# Patient Record
Sex: FEMALE | Race: WHITE | NOT HISPANIC OR LATINO | Employment: FULL TIME | URBAN - METROPOLITAN AREA
[De-identification: names, ages, dates, MRNs, and addresses within clinical notes are randomized per-mention and may not be internally consistent; named-entity substitution may affect disease eponyms.]

---

## 2017-07-18 RX ORDER — NORETHINDRONE ACETATE AND ETHINYL ESTRADIOL 1.5-30(21)
1 KIT ORAL DAILY
Qty: 84 TABLET | Refills: 0 | Status: SHIPPED | OUTPATIENT
Start: 2017-07-18 | End: 2017-10-12 | Stop reason: SDUPTHER

## 2017-10-12 RX ORDER — NORETHINDRONE ACETATE AND ETHINYL ESTRADIOL 1.5-30(21)
1 KIT ORAL DAILY
Qty: 84 TABLET | Refills: 0 | Status: SHIPPED | OUTPATIENT
Start: 2017-10-12 | End: 2017-12-05 | Stop reason: SDUPTHER

## 2017-12-05 ENCOUNTER — OFFICE VISIT (OUTPATIENT)
Dept: OBSTETRICS AND GYNECOLOGY | Facility: CLINIC | Age: 27
End: 2017-12-05
Payer: COMMERCIAL

## 2017-12-05 VITALS
DIASTOLIC BLOOD PRESSURE: 60 MMHG | BODY MASS INDEX: 19.71 KG/M2 | WEIGHT: 130.06 LBS | SYSTOLIC BLOOD PRESSURE: 110 MMHG | HEIGHT: 68 IN

## 2017-12-05 DIAGNOSIS — Z30.9 ENCOUNTER FOR CONTRACEPTIVE MANAGEMENT, UNSPECIFIED TYPE: ICD-10-CM

## 2017-12-05 DIAGNOSIS — Z11.3 SCREENING EXAMINATION FOR STD (SEXUALLY TRANSMITTED DISEASE): ICD-10-CM

## 2017-12-05 DIAGNOSIS — Z01.419 ENCOUNTER FOR GYNECOLOGICAL EXAMINATION: Primary | ICD-10-CM

## 2017-12-05 PROCEDURE — 99395 PREV VISIT EST AGE 18-39: CPT | Mod: S$GLB,,, | Performed by: OBSTETRICS & GYNECOLOGY

## 2017-12-05 PROCEDURE — 87591 N.GONORRHOEAE DNA AMP PROB: CPT

## 2017-12-05 PROCEDURE — 99999 PR PBB SHADOW E&M-EST. PATIENT-LVL II: CPT | Mod: PBBFAC,,, | Performed by: OBSTETRICS & GYNECOLOGY

## 2017-12-05 RX ORDER — ACYCLOVIR 400 MG/1
400 TABLET ORAL 2 TIMES DAILY
Refills: 11 | COMMUNITY
Start: 2017-10-31 | End: 2018-04-20 | Stop reason: SDUPTHER

## 2017-12-05 RX ORDER — LEVALBUTEROL TARTRATE 45 UG/1
AEROSOL, METERED ORAL
Refills: 0 | COMMUNITY
Start: 2017-10-10 | End: 2018-04-20 | Stop reason: SDUPTHER

## 2017-12-05 RX ORDER — NORETHINDRONE ACETATE AND ETHINYL ESTRADIOL 1.5-30(21)
1 KIT ORAL DAILY
Qty: 84 TABLET | Refills: 3 | Status: SHIPPED | OUTPATIENT
Start: 2017-12-05 | End: 2018-11-26 | Stop reason: SDUPTHER

## 2017-12-05 NOTE — PROGRESS NOTES
"CC: Well woman exam    No Harris is a 27 y.o. female  presents for a well woman exam.  She is established.  No gyn issues.  This past summer had angio at [facility] that shows her pig valve will last her another 3 years which is very promising.      Past Medical History:   Diagnosis Date    Tetralogy of Fallot s/p repair     w/ valve replacement       Past Surgical History:   Procedure Laterality Date    BREAST SURGERY Right 2016    Removal of previous implant and liposuction; fat graft to breast. Done in [place]    Open heart surgery      x4 to correct septal defects and 2 vavle replacement; right breast implant       OB History    Para Term  AB Living   0 0 0 0 0 0   SAB TAB Ectopic Multiple Live Births   0 0 0 0               Family History   Problem Relation Age of Onset    No Known Problems Father     No Known Problems Mother     Breast cancer Neg Hx     Diabetes Neg Hx        Social History   Substance Use Topics    Smoking status: Never Smoker    Smokeless tobacco: Never Used    Alcohol use Yes       /60   Ht 5' 8" (1.727 m)   Wt 59 kg (130 lb 1.1 oz)   LMP 11/15/2017   BMI 19.78 kg/m²     ROS:  GENERAL: Denies weight gain or weight loss. Feeling well overall.   SKIN: Denies rash or lesions.   HEAD: Denies head injury or headache.   NODES: Denies enlarged lymph nodes.   CHEST: Denies chest pain or shortness of breath.   CARDIOVASCULAR: Denies palpitations or left sided chest pain.   ABDOMEN: No abdominal pain, constipation, diarrhea, nausea, vomiting or rectal bleeding.   URINARY: No frequency, dysuria, hematuria, or burning on urination.  REPRODUCTIVE: See HPI.   BREASTS: The patient performs breast self-examination and denies pain, lumps, or nipple discharge.   HEMATOLOGIC: No easy bruisability or excessive bleeding.  MUSCULOSKELETAL: Denies joint pain or swelling.   NEUROLOGIC: Denies syncope or weakness.   PSYCHIATRIC: Denies depression, anxiety or mood " swings.    Physical Exam:    APPEARANCE: Well nourished, well developed, in no acute distress.  AFFECT: WNL, alert and oriented x 3  SKIN: No acne or hirsutism  NECK: Neck symmetric without masses or thyromegaly  NODES: No inguinal, cervical, axillary, or femoral lymph node enlargement  CHEST: Good respiratory effect  ABDOMEN: Soft.  No tenderness or masses.  No hepatosplenomegaly.  No hernias.  BREASTS: Symmetrical, no skin changes or visible lesions.  No palpable masses, nipple discharge bilaterally.  PELVIC: Normal external genitalia without lesions.  Normal hair distribution.  Adequate perineal body, normal urethral meatus.  Vagina moist and well rugated without lesions or discharge.  Cervix pink, without lesions, discharge or tenderness.  No significant cystocele or rectocele.  Bimanual exam shows uterus to be normal size, regular, mobile and nontender.  Adnexa without masses or tenderness.    EXTREMITIES: No edema.    ASSESSMENT AND PLAN  1. Encounter for gynecological examination     2. Screening examination for STD (sexually transmitted disease)  C. trachomatis/N. gonorrhoeae by AMP DNA Cervix   3. Encounter for contraceptive management, unspecified type  norethindrone-ethinyl estradiol-iron (GILDESS FE 1.5/30, 28,) 1.5 mg-30 mcg (21)/75 mg (7) tablet       Patient was counseled today on A.C.S. Pap guidelines and recommendations for yearly pelvic exams, mammograms and monthly self breast exams; to see her PCP for other health maintenance.     Return in about 1 year (around 12/5/2018).

## 2017-12-06 LAB
C TRACH DNA SPEC QL NAA+PROBE: NOT DETECTED
N GONORRHOEA DNA SPEC QL NAA+PROBE: NOT DETECTED

## 2018-04-20 ENCOUNTER — OFFICE VISIT (OUTPATIENT)
Dept: INTERNAL MEDICINE | Facility: CLINIC | Age: 28
End: 2018-04-20
Payer: COMMERCIAL

## 2018-04-20 VITALS
HEIGHT: 68 IN | RESPIRATION RATE: 16 BRPM | WEIGHT: 138.88 LBS | HEART RATE: 61 BPM | TEMPERATURE: 98 F | BODY MASS INDEX: 21.05 KG/M2 | SYSTOLIC BLOOD PRESSURE: 93 MMHG | DIASTOLIC BLOOD PRESSURE: 60 MMHG

## 2018-04-20 DIAGNOSIS — Z00.00 ANNUAL PHYSICAL EXAM: Primary | ICD-10-CM

## 2018-04-20 DIAGNOSIS — Z91.09 ENVIRONMENTAL ALLERGIES: ICD-10-CM

## 2018-04-20 DIAGNOSIS — R21 RASH: ICD-10-CM

## 2018-04-20 DIAGNOSIS — B00.9 RECURRENT HERPES SIMPLEX: ICD-10-CM

## 2018-04-20 DIAGNOSIS — J45.909 CHILDHOOD ASTHMA, UNSPECIFIED ASTHMA SEVERITY, UNSPECIFIED WHETHER COMPLICATED, UNSPECIFIED WHETHER PERSISTENT: ICD-10-CM

## 2018-04-20 PROCEDURE — 99999 PR PBB SHADOW E&M-EST. PATIENT-LVL IV: CPT | Mod: PBBFAC,,, | Performed by: INTERNAL MEDICINE

## 2018-04-20 PROCEDURE — 99385 PREV VISIT NEW AGE 18-39: CPT | Mod: S$GLB,,, | Performed by: INTERNAL MEDICINE

## 2018-04-20 RX ORDER — ACYCLOVIR 400 MG/1
400 TABLET ORAL 2 TIMES DAILY
Qty: 60 TABLET | Refills: 11 | Status: SHIPPED | OUTPATIENT
Start: 2018-04-20

## 2018-04-20 RX ORDER — LEVALBUTEROL TARTRATE 45 UG/1
AEROSOL, METERED ORAL
Qty: 15 G | Refills: 1 | Status: SHIPPED | OUTPATIENT
Start: 2018-04-20

## 2018-04-20 NOTE — PROGRESS NOTES
Subjective:       Patient ID: No Harris is a 27 y.o. female.    Chief Complaint: Annual Exam    HPI     27 y.o. female here for annual exam.     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  [place] once a week  Diet:  Learning how to cook more.  Does not eat out all that often. Aware of what she eats and tries to eat better.  Drinks mostly water, some tea.    She is on acyclovir twice daily as a preventive measure.  She was told to do this for a year.  It worked for 11 months.  And went back.  Did not get further advice.  She has an appointment with ID - Dr. Lo.  She was told to see rheumatology as well.  She has had pealing of her lips as well.  She gets bumps on her lips that last for 1-3 days and recur.    Past Medical History:   Diagnosis Date    Childhood asthma     Environmental allergies     Fever blister     recurrent    Tetralogy of Fallot s/p repair     w/ valve replacement     Past Surgical History:   Procedure Laterality Date    BREAST SURGERY Right 2016    Removal of previous implant and liposuction; fat graft to breast. Done in [place].  3 augmentations on right side    Open heart surgery      x4 to correct septal defects and 2 vavle replacement; right breast implant    RETINAL LASER PROCEDURE       Social History     Social History    Marital status: Single     Spouse name: N/A    Number of children: N/A    Years of education: N/A     Occupational History    Not on file.     Social History Main Topics    Smoking status: Never Smoker    Smokeless tobacco: Never Used    Alcohol use Yes      Comment: once every other week    Drug use: No    Sexual activity: Yes     Partners: Male     Birth  control/ protection: OCP     Other Topics Concern    Not on file     Social History Narrative    No narrative on file     Review of patient's allergies indicates:   Allergen Reactions    Morphine     Tramadol     Vicodin [hydrocodone-acetaminophen]      Ms. Harris had no medications administered during this visit.    Review of Systems   Constitutional: Negative for chills, fever and unexpected weight change.   HENT: Negative for congestion, postnasal drip and sore throat.    Eyes: Negative for redness and visual disturbance.   Respiratory: Negative for cough and shortness of breath.    Cardiovascular: Negative for chest pain and palpitations.   Gastrointestinal: Negative for abdominal pain, constipation, diarrhea, nausea and vomiting.   Genitourinary: Negative for dysuria, frequency and hematuria.   Musculoskeletal: Negative for arthralgias and myalgias.   Skin: Negative for color change and rash.   Neurological: Negative for dizziness and headaches.       Objective:      Physical Exam   Constitutional: She is oriented to person, place, and time. She appears well-developed and well-nourished.   HENT:   Head: Normocephalic and atraumatic.   Mouth/Throat: No oropharyngeal exudate.   Eyes: EOM are normal. Pupils are equal, round, and reactive to light. Right eye exhibits no discharge. Left eye exhibits no discharge. No scleral icterus.   Neck: Normal range of motion. Neck supple. No tracheal deviation present. No thyromegaly present.   Cardiovascular: Normal rate, regular rhythm and normal heart sounds.  Exam reveals no gallop and no friction rub.    No murmur heard.  Pulmonary/Chest: Effort normal and breath sounds normal. No respiratory distress. She has no wheezes. She has no rales. She exhibits no tenderness.   Abdominal: Soft. Bowel sounds are normal. She exhibits no distension and no mass. There is no tenderness. There is no rebound and no guarding.   Musculoskeletal: Normal range of motion. She exhibits no  edema or tenderness.   Neurological: She is alert and oriented to person, place, and time.   Skin: Skin is warm and dry. No rash noted. No erythema. No pallor.   Psychiatric: She has a normal mood and affect. Her behavior is normal.   Vitals reviewed.      Assessment:       1. Annual physical exam    2. Recurrent herpes simplex    3. Rash    4. Childhood asthma, unspecified asthma severity, unspecified whether complicated, unspecified whether persistent    5. Environmental allergies        Plan:       1.  Check CBC, CMP, TSH, lipids.  Discussed exercise with patient.  Refuses flu vaccine.  Up-to-date on tetanus vaccine.  Up-to-date on GYN screening.  No need for mammogram or colonoscopy yet.  2.  Refer to infectious disease.  Refill of acyclovir 400 mg twice a day given.  Check HSV titers.  3.  Refer to rheumatology.  Check DEMETRIO.  4.  Refill of Xopenex given as needed.  5.  Benadryl as needed.    Return to clinic in 1 year or sooner if needed.  Advised patient that she sees rheumatology and infectious disease, because of the nonspecific nature of her symptoms, to return so we can discuss further.

## 2018-04-24 ENCOUNTER — LAB VISIT (OUTPATIENT)
Dept: LAB | Facility: HOSPITAL | Age: 28
End: 2018-04-24
Attending: INTERNAL MEDICINE
Payer: COMMERCIAL

## 2018-04-24 DIAGNOSIS — B00.9 RECURRENT HERPES SIMPLEX: ICD-10-CM

## 2018-04-24 DIAGNOSIS — Z00.00 ANNUAL PHYSICAL EXAM: ICD-10-CM

## 2018-04-24 DIAGNOSIS — R21 RASH: ICD-10-CM

## 2018-04-24 LAB
ALBUMIN SERPL BCP-MCNC: 3.9 G/DL
ALP SERPL-CCNC: 34 U/L
ALT SERPL W/O P-5'-P-CCNC: 9 U/L
ANION GAP SERPL CALC-SCNC: 8 MMOL/L
AST SERPL-CCNC: 14 U/L
BASOPHILS # BLD AUTO: 0.04 K/UL
BASOPHILS NFR BLD: 0.7 %
BILIRUB SERPL-MCNC: 0.8 MG/DL
BUN SERPL-MCNC: 11 MG/DL
CALCIUM SERPL-MCNC: 9.4 MG/DL
CHLORIDE SERPL-SCNC: 108 MMOL/L
CHOLEST SERPL-MCNC: 138 MG/DL
CHOLEST/HDLC SERPL: 3.7 {RATIO}
CO2 SERPL-SCNC: 22 MMOL/L
CREAT SERPL-MCNC: 0.8 MG/DL
CRP SERPL-MCNC: 4 MG/L
DIFFERENTIAL METHOD: ABNORMAL
EOSINOPHIL # BLD AUTO: 0.3 K/UL
EOSINOPHIL NFR BLD: 5.2 %
ERYTHROCYTE [DISTWIDTH] IN BLOOD BY AUTOMATED COUNT: 13 %
ERYTHROCYTE [SEDIMENTATION RATE] IN BLOOD BY WESTERGREN METHOD: 9 MM/HR
EST. GFR  (AFRICAN AMERICAN): >60 ML/MIN/1.73 M^2
EST. GFR  (NON AFRICAN AMERICAN): >60 ML/MIN/1.73 M^2
GLUCOSE SERPL-MCNC: 80 MG/DL
HCT VFR BLD AUTO: 36 %
HDLC SERPL-MCNC: 37 MG/DL
HDLC SERPL: 26.8 %
HGB BLD-MCNC: 12.3 G/DL
IMM GRANULOCYTES # BLD AUTO: 0.02 K/UL
IMM GRANULOCYTES NFR BLD AUTO: 0.4 %
LDLC SERPL CALC-MCNC: 82 MG/DL
LYMPHOCYTES # BLD AUTO: 1.8 K/UL
LYMPHOCYTES NFR BLD: 32.6 %
MCH RBC QN AUTO: 32.2 PG
MCHC RBC AUTO-ENTMCNC: 34.2 G/DL
MCV RBC AUTO: 94 FL
MONOCYTES # BLD AUTO: 0.4 K/UL
MONOCYTES NFR BLD: 6.6 %
NEUTROPHILS # BLD AUTO: 3.1 K/UL
NEUTROPHILS NFR BLD: 54.5 %
NONHDLC SERPL-MCNC: 101 MG/DL
NRBC BLD-RTO: 0 /100 WBC
PLATELET # BLD AUTO: 184 K/UL
PMV BLD AUTO: 10.6 FL
POTASSIUM SERPL-SCNC: 3.9 MMOL/L
PROT SERPL-MCNC: 7.4 G/DL
RBC # BLD AUTO: 3.82 M/UL
SODIUM SERPL-SCNC: 138 MMOL/L
TRIGL SERPL-MCNC: 95 MG/DL
TSH SERPL DL<=0.005 MIU/L-ACNC: 2.68 UIU/ML
WBC # BLD AUTO: 5.61 K/UL

## 2018-04-24 PROCEDURE — 36415 COLL VENOUS BLD VENIPUNCTURE: CPT | Mod: PO

## 2018-04-24 PROCEDURE — 84443 ASSAY THYROID STIM HORMONE: CPT

## 2018-04-24 PROCEDURE — 86696 HERPES SIMPLEX TYPE 2 TEST: CPT

## 2018-04-24 PROCEDURE — 80061 LIPID PANEL: CPT

## 2018-04-24 PROCEDURE — 86140 C-REACTIVE PROTEIN: CPT

## 2018-04-24 PROCEDURE — 80053 COMPREHEN METABOLIC PANEL: CPT

## 2018-04-24 PROCEDURE — 85025 COMPLETE CBC W/AUTO DIFF WBC: CPT

## 2018-04-24 PROCEDURE — 86038 ANTINUCLEAR ANTIBODIES: CPT

## 2018-04-24 PROCEDURE — 85651 RBC SED RATE NONAUTOMATED: CPT

## 2018-04-25 LAB — ANA SER QL IF: NORMAL

## 2018-04-27 LAB
HSV1 IGG SERPL QL IA: POSITIVE
HSV2 IGG SERPL QL IA: NEGATIVE

## 2018-05-01 ENCOUNTER — OFFICE VISIT (OUTPATIENT)
Dept: INFECTIOUS DISEASES | Facility: CLINIC | Age: 28
End: 2018-05-01
Payer: COMMERCIAL

## 2018-05-01 ENCOUNTER — PATIENT MESSAGE (OUTPATIENT)
Dept: INFECTIOUS DISEASES | Facility: CLINIC | Age: 28
End: 2018-05-01

## 2018-05-01 VITALS — BODY MASS INDEX: 20.58 KG/M2 | HEIGHT: 68 IN | WEIGHT: 135.81 LBS | TEMPERATURE: 99 F

## 2018-05-01 DIAGNOSIS — Z87.74 TETRALOGY OF FALLOT S/P REPAIR: ICD-10-CM

## 2018-05-01 DIAGNOSIS — K13.0 LIP LESION: Primary | ICD-10-CM

## 2018-05-01 DIAGNOSIS — J45.909 CHILDHOOD ASTHMA, UNSPECIFIED ASTHMA SEVERITY, UNSPECIFIED WHETHER COMPLICATED, UNSPECIFIED WHETHER PERSISTENT: ICD-10-CM

## 2018-05-01 PROCEDURE — 99999 PR PBB SHADOW E&M-EST. PATIENT-LVL III: CPT | Mod: PBBFAC,,, | Performed by: INTERNAL MEDICINE

## 2018-05-01 PROCEDURE — 99205 OFFICE O/P NEW HI 60 MIN: CPT | Mod: S$GLB,,, | Performed by: INTERNAL MEDICINE

## 2018-05-01 NOTE — PROGRESS NOTES
Subjective:      Patient ID: No Harris is a 27 y.o. female.    Chief Complaint:   Consult re lip lesions from Dr. Josh Morales      History of Present Illness    Over 1 year ago having recurrent herpes labialis lesions. These generally occurred on the external lip but not at the vermilion border. She has had two episodes of severe inflammation of the external lip surface with sloughing of the skin of the external lip which was extremely painful and took weeks to heal up. Her treating physician thought it mighe be related to HSV infection and gave her prophylactic acyclovir 400 mg bid which prevented problems for 11 months, but in the 11th  Month, she developed the second episode of lip inflammation with sloughing of the skin. She did not see any bullous lesions or vesicular lesions which preceded the skin sloughing. Someone suggested to her that it was caused by strep infection, but she says that no cultures were done at any point. She has had asthma and multiple surgeries for Tetralogy of Fallot (has prosthetic AV) but has otherwise been in good health.    Today she has no oral lesions. Had a comprehensive lab profile on 4/24 which was completely normal including ESR and CRP.    Review of Systems   Constitution: Negative for chills, decreased appetite, fever, weakness, malaise/fatigue, night sweats, weight gain and weight loss.   HENT: Positive for congestion. Negative for ear pain, hearing loss, hoarse voice, sore throat and tinnitus.    Eyes: Negative for blurred vision, redness and visual disturbance.   Cardiovascular: Negative for chest pain, leg swelling and palpitations.   Respiratory: Negative for cough, hemoptysis, shortness of breath and sputum production.    Hematologic/Lymphatic: Negative for adenopathy. Does not bruise/bleed easily.   Skin: Positive for dry skin, itching and rash. Negative for suspicious lesions.   Musculoskeletal: Positive for back pain. Negative for joint pain, myalgias and neck pain.    Gastrointestinal: Negative for abdominal pain, constipation, diarrhea, heartburn, nausea and vomiting.   Genitourinary: Negative for dysuria, flank pain, frequency, hematuria, hesitancy and urgency.   Neurological: Positive for headaches. Negative for dizziness, numbness and paresthesias.   Psychiatric/Behavioral: Negative for depression and memory loss. The patient does not have insomnia and is not nervous/anxious.      Objective:   Physical Exam   Constitutional: She is oriented to person, place, and time. She appears well-developed and well-nourished. No distress.   HENT:   Mouth/Throat: Oropharynx is clear and moist.   No oral or lip lesions present   Eyes: EOM are normal. Pupils are equal, round, and reactive to light.   Neck: Normal range of motion. No JVD present. No tracheal deviation present. No thyromegaly present.   Lymphadenopathy:     She has no cervical adenopathy.   Neurological: She is alert and oriented to person, place, and time.   Skin: Skin is warm and dry. No rash noted. No erythema.   Psychiatric: She has a normal mood and affect. Her behavior is normal. Thought content normal.   Vitals reviewed.    Assessment:       1. Lip lesions; could be HSV but could also be some type of bullous disease like Álvarez Fadi syndrome, pemphigus, etc.   2. Tetralogy of Fallot s/p repair    3. Childhood asthma, unspecified asthma severity, unspecified whether complicated, unspecified whether persistent          Plan:         1. She has appt to see rheumatology    2. Asked her to notify me if lesions recur so she could be worked up with HSV NAAT and derm appraisal( biopsy) at that time

## 2018-05-01 NOTE — LETTER
May 1, 2018      Josh Morales MD  [address] [facility]  [place] [address]           [facility]  [address]  [place] [address]  Phone: [phone]  Fax: [phone]          Patient: No Harris   MR Number: [id number]   YOB: 1990   Date of Visit: 5/1/2018       Dear Dr. Josh Morales:    Thank you for referring No Harris to me for evaluation. Attached you will find relevant portions of my assessment and plan of care.    If you have questions, please do not hesitate to call me. I look forward to following No Harris along with you.    Sincerely,    Calvin Phipps MD    Enclosure  CC:  No Recipients    If you would like to receive this communication electronically, please contact [email or web address] or [phone] to request more information on [facility] access.    For providers and/or their staff who would like to refer a patient to [facility], please contact us through our one-stop-shop provider referral line, [facility] , at [phone].    If you feel you have received this communication in error or would no longer like to receive these types of communications, please e-mail [email or web address]

## 2018-05-10 ENCOUNTER — INITIAL CONSULT (OUTPATIENT)
Dept: RHEUMATOLOGY | Facility: CLINIC | Age: 28
End: 2018-05-10
Payer: COMMERCIAL

## 2018-05-10 VITALS
DIASTOLIC BLOOD PRESSURE: 64 MMHG | WEIGHT: 134.63 LBS | HEART RATE: 69 BPM | BODY MASS INDEX: 20.4 KG/M2 | SYSTOLIC BLOOD PRESSURE: 99 MMHG | HEIGHT: 68 IN

## 2018-05-10 DIAGNOSIS — K13.0 LIP ULCER: Primary | ICD-10-CM

## 2018-05-10 PROCEDURE — 3008F BODY MASS INDEX DOCD: CPT | Mod: CPTII,S$GLB,, | Performed by: INTERNAL MEDICINE

## 2018-05-10 PROCEDURE — 99204 OFFICE O/P NEW MOD 45 MIN: CPT | Mod: S$GLB,,, | Performed by: INTERNAL MEDICINE

## 2018-05-10 PROCEDURE — 99999 PR PBB SHADOW E&M-EST. PATIENT-LVL III: CPT | Mod: PBBFAC,,, | Performed by: INTERNAL MEDICINE

## 2018-05-10 RX ORDER — CLINDAMYCIN HYDROCHLORIDE 300 MG/1
300 CAPSULE ORAL EVERY 6 HOURS
Qty: 40 CAPSULE | Refills: 0 | Status: SHIPPED | OUTPATIENT
Start: 2018-05-10 | End: 2018-05-20

## 2018-05-10 RX ORDER — CETIRIZINE HYDROCHLORIDE 5 MG/1
5 TABLET ORAL DAILY
COMMUNITY
End: 2019-02-12

## 2018-05-10 RX ORDER — CLINDAMYCIN HYDROCHLORIDE 300 MG/1
300 CAPSULE ORAL EVERY 6 HOURS
Qty: 40 CAPSULE | Refills: 0 | Status: SHIPPED | OUTPATIENT
Start: 2018-05-10 | End: 2018-05-10 | Stop reason: SDUPTHER

## 2018-05-10 NOTE — PROGRESS NOTES
Chief Complaint   Patient presents with    Disease Management       Patient was referred by     History of presenting illness    27 year old female has recurrent    4/2018 she had labs   Normal CBC,CMP  Normal TSH  HSV 1 IgG  ESR,CRP normal  DEMETRIO negative      A year ago :her symptoms started  She got many fever blisters  Very painful  All on the lip line  One comes,heals and the other comes within days  None inside the mouth  When one blister comes up,the skin around it on the lips becomes dry dry and then it cracks and she has to constantly use chapstick  Then finally the skin sloughs off    When this happened the first time she couldn't eat,smile or talk    Went to urgent care  Secondary strep skin infection diagnosed from fever blisters  She got antibiotics and liquid lidocaine  She felt better    Then     She got acyclovir bid,she did well for 11 months    Now 12th month after the first episode  She had only one fever blister,but the skin around the blister peeled off this time    She saw PCP,she suggested  See ID,rheumatology :     Now all the blisters are gone  She doesn't feel normal yet  The lower lip feels sore and has few red bumps    She admits to biting her cheeks     She is allergic to many things  She gets allergy shots once a week    No malar rash,photosensitivity  Eczema +  No telangiectasias  No calcinosis  No patchy alopecia  No nasal ulcers  No sicca symptoms   No pleurisy or any cardiopulmonary complaints  No dysphagia,diplopia and dysphonia and muscle weakness  No n/v/d/c  No acid reflux+  No raynaud's+  No digital ulcers     No cytopenias  No renal issues    No blood clots     No fever,chills,night sweats,weight loss and loss of appetite     No pregnancy losses    No neurologic issues    No arthralgias      Past history : surgery for TOF    Family history : none    Social history : not a smoker,alcholic    Review of Systems   Constitutional: Negative for activity change, appetite change,  chills, diaphoresis, fatigue, fever and unexpected weight change.   HENT: Negative for congestion, dental problem, drooling, ear discharge, ear pain, facial swelling, hearing loss, mouth sores, nosebleeds, postnasal drip, rhinorrhea, sinus pain, sinus pressure, sneezing, sore throat, tinnitus, trouble swallowing and voice change.    Eyes: Negative for photophobia, pain, discharge, redness, itching and visual disturbance.   Respiratory: Negative for apnea, cough, choking, chest tightness, shortness of breath, wheezing and stridor.    Cardiovascular: Negative for chest pain, palpitations and leg swelling.   Gastrointestinal: Negative for abdominal distention, abdominal pain, anal bleeding, blood in stool, constipation, diarrhea, nausea, rectal pain and vomiting.   Endocrine: Negative for cold intolerance, heat intolerance, polydipsia, polyphagia and polyuria.   Genitourinary: Negative for decreased urine volume, difficulty urinating, dysuria, enuresis, flank pain, frequency, genital sores, hematuria and urgency.   Musculoskeletal: Negative for arthralgias, back pain, gait problem, joint swelling, myalgias, neck pain and neck stiffness.   Skin: Negative for color change, pallor, rash and wound.   Allergic/Immunologic: Negative for environmental allergies, food allergies and immunocompromised state.   Neurological: Negative for dizziness, tremors, seizures, syncope, facial asymmetry, speech difficulty, weakness, light-headedness, numbness and headaches.   Hematological: Negative for adenopathy. Does not bruise/bleed easily.   Psychiatric/Behavioral: Negative for agitation, behavioral problems, confusion, decreased concentration, dysphoric mood, hallucinations, self-injury, sleep disturbance and suicidal ideas. The patient is not nervous/anxious and is not hyperactive.      Physical Exam     LOWERY-28 tender joint count: 0  LOWERY-28 swollen joint count: 0    Physical Exam   Constitutional: She is oriented to person, place,  and time and well-developed, well-nourished, and in no distress. No distress.   HENT:   Head: Normocephalic.   Mouth/Throat: Oropharynx is clear and moist.   Eyes: Conjunctivae are normal. Pupils are equal, round, and reactive to light. Right eye exhibits no discharge. Left eye exhibits no discharge. No scleral icterus.   Neck: Normal range of motion. No thyromegaly present.   Cardiovascular: Normal rate, regular rhythm, normal heart sounds and intact distal pulses.    Pulmonary/Chest: Effort normal and breath sounds normal. No stridor.   Abdominal: Soft. Bowel sounds are normal.   Lymphadenopathy:     She has no cervical adenopathy.   Neurological: She is alert and oriented to person, place, and time.   Skin: Skin is warm. No rash noted. She is not diaphoretic.     Acne on the chin  Angular cheilitis  Small red lesions on the inner lip   Psychiatric: Affect and judgment normal.   Musculoskeletal: Normal range of motion.         Assessment     27 year old female with tetralogy of fallot s/p repair comes with two episodes of lip lesions in the past year  First episode she had recurrent lesions on the lip,which caused sloughing of the lip  This year she had one lesion which again led to sloughing of the lip  She has no other symptoms  She has no oral and nasal ulcers    Exam today : angular cheilitis and lip erythematous lesions    This doesn't look like an autoimmune illness like lupus or vasculitis or behcet's  DEMETRIO negative in addition    1. Lip ulcer        Reviewed labs    New problem     Plan    Offered a course of antibiotics    Suggested vit b1,b6 and c    See dermatology,may be a biopsy of the ulcer when it recurs again    ID has asked her to call them when she flares so that they can take cultures     Suggested to talk to allergy and see if they have suggestions,since she says her lips get dry and crack     No was seen today for disease management.    Diagnoses and all orders for this visit:    Lip  ulcer  -     Sjogrens syndrome-A extractable nuclear antibody; Future  -     Sjogrens syndrome-B extractable nuclear antibody; Future  -     Anti-neutrophilic cytoplasmic antibody; Future  -     Myeloperoxidase Antibody (MPO); Future  -     Proteinase 3 Autoantibodies; Future  -     Rheumatoid factor; Future  -     Cyclic citrul peptide antibody, IgG; Future  -     C3 complement; Standing  -     C4 complement; Standing  -     Complement, total; Future  -     Cryoglobulin; Future  -     RPR; Future  -     HIV-1 and HIV-2 antibodies; Future  -     Hepatitis B surface antigen; Future  -     Hepatitis C antibody; Future  -     HEPATITIS B SURFACE ANTIBODY; Future  -     Hepatitis B core antibody, total; Future  -     HLA B27 Antigen; Future    Other orders  -     Discontinue: clindamycin (CLEOCIN) 300 MG capsule; Take 1 capsule (300 mg total) by mouth every 6 (six) hours.  -     clindamycin (CLEOCIN) 300 MG capsule; Take 1 capsule (300 mg total) by mouth every 6 (six) hours.        Labs to complete the w/u  rtc prn

## 2018-05-10 NOTE — LETTER
May 10, 2018      Josh Morales MD  [address] [facility]  [place] [address]           [facility] - Rheumatology  [address]  [place] [address]  Phone: [phone]  Fax: [phone]          Patient: No Harris   MR Number: [id number]   YOB: 1990   Date of Visit: 5/10/2018       Dear Dr. Josh Morales:    Thank you for referring No Harris to me for evaluation. Attached you will find relevant portions of my assessment and plan of care.    If you have questions, please do not hesitate to call me. I look forward to following No Harris along with you.    Sincerely,    Mohan Crenshaw MD    Enclosure  CC:  No Recipients    If you would like to receive this communication electronically, please contact [email or web address] or [phone] to request more information on [facility] access.    For providers and/or their staff who would like to refer a patient to [facility], please contact us through our one-stop-shop provider referral line, [facility] , at [phone].    If you feel you have received this communication in error or would no longer like to receive these types of communications, please e-mail [email or web address]          Subjective:       Patient ID: Ifeoma Bernal 

## 2018-05-15 ENCOUNTER — PATIENT MESSAGE (OUTPATIENT)
Dept: INFECTIOUS DISEASES | Facility: CLINIC | Age: 28
End: 2018-05-15

## 2018-05-16 DIAGNOSIS — K13.0 LIP LESION: Primary | ICD-10-CM

## 2018-05-18 ENCOUNTER — LAB VISIT (OUTPATIENT)
Dept: LAB | Facility: HOSPITAL | Age: 28
End: 2018-05-18
Attending: INTERNAL MEDICINE
Payer: COMMERCIAL

## 2018-05-18 DIAGNOSIS — K13.0 LIP LESION: ICD-10-CM

## 2018-05-18 LAB
C3 SERPL-MCNC: 127 MG/DL
C4 SERPL-MCNC: 32 MG/DL
CCP AB SER IA-ACNC: 0.5 U/ML
HBV CORE AB SERPL QL IA: NEGATIVE
HBV SURFACE AB SER-ACNC: NEGATIVE M[IU]/ML
HBV SURFACE AG SERPL QL IA: NEGATIVE
HCV AB SERPL QL IA: NEGATIVE
HIV 1+2 AB+HIV1 P24 AG SERPL QL IA: NEGATIVE
RHEUMATOID FACT SERPL-ACNC: <10 IU/ML

## 2018-05-18 PROCEDURE — 86803 HEPATITIS C AB TEST: CPT

## 2018-05-18 PROCEDURE — 86255 FLUORESCENT ANTIBODY SCREEN: CPT

## 2018-05-18 PROCEDURE — 83516 IMMUNOASSAY NONANTIBODY: CPT

## 2018-05-18 PROCEDURE — 86592 SYPHILIS TEST NON-TREP QUAL: CPT

## 2018-05-18 PROCEDURE — 87340 HEPATITIS B SURFACE AG IA: CPT

## 2018-05-18 PROCEDURE — 86235 NUCLEAR ANTIGEN ANTIBODY: CPT | Mod: 59

## 2018-05-18 PROCEDURE — 81374 HLA I TYPING 1 ANTIGEN LR: CPT | Mod: PO

## 2018-05-18 PROCEDURE — 86431 RHEUMATOID FACTOR QUANT: CPT

## 2018-05-18 PROCEDURE — 82595 ASSAY OF CRYOGLOBULIN: CPT

## 2018-05-18 PROCEDURE — 86706 HEP B SURFACE ANTIBODY: CPT

## 2018-05-18 PROCEDURE — 83516 IMMUNOASSAY NONANTIBODY: CPT | Mod: 59

## 2018-05-18 PROCEDURE — 86235 NUCLEAR ANTIGEN ANTIBODY: CPT

## 2018-05-18 PROCEDURE — 86703 HIV-1/HIV-2 1 RESULT ANTBDY: CPT

## 2018-05-18 PROCEDURE — 86200 CCP ANTIBODY: CPT

## 2018-05-18 PROCEDURE — 86160 COMPLEMENT ANTIGEN: CPT

## 2018-05-18 PROCEDURE — 86160 COMPLEMENT ANTIGEN: CPT | Mod: 59

## 2018-05-18 PROCEDURE — 86704 HEP B CORE ANTIBODY TOTAL: CPT

## 2018-05-19 LAB
ANTI-SSA ANTIBODY: 6.63 EU
ANTI-SSA INTERPRETATION: NEGATIVE
ANTI-SSB ANTIBODY: 3.17 EU
ANTI-SSB INTERPRETATION: NEGATIVE
PROTEINASE3 IGG SER-ACNC: <0.2 U
RPR SER QL: NORMAL

## 2018-05-21 LAB
ANCA AB TITR SER IF: NORMAL TITER
MYELOPEROXIDASE AB SER-ACNC: 4 UNITS
P-ANCA TITR SER IF: NORMAL TITER

## 2018-05-22 LAB
HLA-B27 RELATED AG QL: NEGATIVE
HLA-B27 RELATED AG QL: NORMAL

## 2018-05-29 LAB — CRYOGLOB SER QL: NORMAL

## 2018-06-26 ENCOUNTER — TELEPHONE (OUTPATIENT)
Dept: OBSTETRICS AND GYNECOLOGY | Facility: CLINIC | Age: 28
End: 2018-06-26

## 2018-06-26 RX ORDER — FLUCONAZOLE 150 MG/1
150 TABLET ORAL DAILY
Qty: 2 TABLET | Refills: 0 | Status: SHIPPED | OUTPATIENT
Start: 2018-06-26 | End: 2018-06-28

## 2018-06-26 NOTE — TELEPHONE ENCOUNTER
Pt thinks she has a yeast infection.  C/o itching.  Declined appt, requesting rx.  Advised if no improvement after taking medication she should be seen.  Offered to schedule annual due in December, declined.

## 2018-06-26 NOTE — TELEPHONE ENCOUNTER
Dr. Montoya-- pt states that she has a yeast infection and would like something sent in. Pt's # [phone]

## 2018-07-13 ENCOUNTER — OFFICE VISIT (OUTPATIENT)
Dept: INTERNAL MEDICINE | Facility: CLINIC | Age: 28
End: 2018-07-13
Payer: COMMERCIAL

## 2018-07-13 ENCOUNTER — HOSPITAL ENCOUNTER (OUTPATIENT)
Dept: RADIOLOGY | Facility: HOSPITAL | Age: 28
Discharge: HOME | End: 2018-07-13
Attending: FAMILY MEDICINE
Payer: COMMERCIAL

## 2018-07-13 VITALS
DIASTOLIC BLOOD PRESSURE: 53 MMHG | SYSTOLIC BLOOD PRESSURE: 90 MMHG | BODY MASS INDEX: 20.21 KG/M2 | HEART RATE: 73 BPM | WEIGHT: 133.38 LBS | TEMPERATURE: 99 F | OXYGEN SATURATION: 97 % | HEIGHT: 68 IN

## 2018-07-13 DIAGNOSIS — Z87.74 TETRALOGY OF FALLOT S/P REPAIR: ICD-10-CM

## 2018-07-13 DIAGNOSIS — M54.50 ACUTE BILATERAL LOW BACK PAIN WITHOUT SCIATICA: Primary | ICD-10-CM

## 2018-07-13 DIAGNOSIS — M54.50 ACUTE BILATERAL LOW BACK PAIN WITHOUT SCIATICA: ICD-10-CM

## 2018-07-13 PROCEDURE — 99214 OFFICE O/P EST MOD 30 MIN: CPT | Mod: 25,S$GLB,, | Performed by: FAMILY MEDICINE

## 2018-07-13 PROCEDURE — 3008F BODY MASS INDEX DOCD: CPT | Mod: CPTII,S$GLB,, | Performed by: FAMILY MEDICINE

## 2018-07-13 PROCEDURE — 99999 PR PBB SHADOW E&M-EST. PATIENT-LVL III: CPT | Mod: PBBFAC,,, | Performed by: FAMILY MEDICINE

## 2018-07-13 PROCEDURE — 96372 THER/PROPH/DIAG INJ SC/IM: CPT | Mod: S$GLB,,, | Performed by: FAMILY MEDICINE

## 2018-07-13 RX ORDER — TRIAMCINOLONE ACETONIDE 1 MG/G
PASTE DENTAL
Refills: 0 | COMMUNITY
Start: 2018-05-31

## 2018-07-13 RX ORDER — HYDROCODONE BITARTRATE AND ACETAMINOPHEN 7.5; 325 MG/1; MG/1
TABLET ORAL
Qty: 21 TABLET | Refills: 0 | Status: SHIPPED | OUTPATIENT
Start: 2018-07-13 | End: 2019-02-12

## 2018-07-13 RX ORDER — KETOROLAC TROMETHAMINE 30 MG/ML
60 INJECTION, SOLUTION INTRAMUSCULAR; INTRAVENOUS
Status: COMPLETED | OUTPATIENT
Start: 2018-07-13 | End: 2018-07-13

## 2018-07-13 RX ORDER — BACLOFEN 10 MG/1
10 TABLET ORAL 3 TIMES DAILY
Qty: 30 TABLET | Refills: 2 | Status: SHIPPED | OUTPATIENT
Start: 2018-07-13 | End: 2019-02-12

## 2018-07-13 RX ORDER — CYCLOBENZAPRINE HCL 5 MG
5 TABLET ORAL NIGHTLY
Qty: 30 TABLET | Refills: 0 | Status: SHIPPED | OUTPATIENT
Start: 2018-07-13 | End: 2018-07-23

## 2018-07-13 RX ORDER — METHYLPREDNISOLONE 4 MG/1
TABLET ORAL
Qty: 1 PACKAGE | Refills: 0 | Status: SHIPPED | OUTPATIENT
Start: 2018-07-13 | End: 2018-08-03

## 2018-07-13 RX ADMIN — KETOROLAC TROMETHAMINE 60 MG: 30 INJECTION, SOLUTION INTRAMUSCULAR; INTRAVENOUS at 11:07

## 2018-07-16 ENCOUNTER — HOSPITAL ENCOUNTER (OUTPATIENT)
Dept: RADIOLOGY | Facility: HOSPITAL | Age: 28
Discharge: HOME | End: 2018-07-16
Attending: FAMILY MEDICINE
Payer: COMMERCIAL

## 2018-07-16 PROCEDURE — 72114 X-RAY EXAM L-S SPINE BENDING: CPT | Mod: 26,,, | Performed by: RADIOLOGY

## 2018-07-16 PROCEDURE — 72114 X-RAY EXAM L-S SPINE BENDING: CPT | Mod: TC,PO

## 2018-07-16 NOTE — PROGRESS NOTES
Subjective:   Patient ID: No Harris is a 28 y.o. female.    Chief Complaint: Low-back Pain      HPI  27 yo female was swing dancing with new partner and he did a risky move and pt has since had significant low back pain. No ho low back surgery.   Hx of tetrology of Fallot repair.    Patient queried and denies any further complaints.    ALLERGIES AND MEDICATIONS: updated and reviewed.  Review of patient's allergies indicates:   Allergen Reactions    Morphine     Tramadol     Vicodin [hydrocodone-acetaminophen]        Current Outpatient Prescriptions:     acyclovir (ZOVIRAX) 400 MG tablet, Take 1 tablet (400 mg total) by mouth 2 (two) times daily., Disp: 60 tablet, Rfl: 11    norethindrone-ethinyl estradiol-iron (GILDESS FE 1.5/30, 28,) 1.5 mg-30 mcg (21)/75 mg (7) tablet, Take 1 tablet by mouth once daily., Disp: 84 tablet, Rfl: 3    triamcinolone acetonide 0.1% (KENALOG) 0.1 % paste, APPLY 1 APPLICATION TO AFFECTED AREA AS NEEDED FOUR TIMES A DAY MOUTH/THROAT, Disp: , Rfl: 0    baclofen (LIORESAL) 10 MG tablet, Take 1 tablet (10 mg total) by mouth 3 (three) times daily. Prn muscle spasms. May cause drowsiness, Disp: 30 tablet, Rfl: 2    cetirizine (ZYRTEC) 5 MG tablet, Take 5 mg by mouth once daily., Disp: , Rfl:     cyclobenzaprine (FLEXERIL) 5 MG tablet, Take 1 tablet (5 mg total) by mouth nightly. 1-2 po qhs prn muscle spasms for 10 days, Disp: 30 tablet, Rfl: 0    diphenhydrAMINE (BENADRYL) 25 mg capsule, Take 25 mg by mouth every 6 (six) hours as needed for Itching., Disp: , Rfl:     HYDROcodone-acetaminophen (NORCO) 7.5-325 mg per tablet, 1/2 - 1 po q 4 hrs prn severe pain. Do not take and drive., Disp: 21 tablet, Rfl: 0    levalbuterol (XOPENEX HFA) 45 mcg/actuation inhaler, 1 PUFF AS NEEDED EVERY 4 HRS INHALATION, Disp: 15 g, Rfl: 1    methylPREDNISolone (MEDROL DOSEPACK) 4 mg tablet, use as directed, Disp: 1 Package, Rfl: 0    Review of Systems   Constitutional: Negative for activity change,  "appetite change, chills, diaphoresis, fatigue, fever and unexpected weight change.   HENT: Negative for congestion, ear discharge, ear pain, facial swelling, hearing loss, nosebleeds, postnasal drip, rhinorrhea, sinus pressure, sneezing, sore throat, tinnitus, trouble swallowing and voice change.    Eyes: Negative for photophobia, pain, discharge, redness, itching and visual disturbance.   Respiratory: Negative for cough, chest tightness, shortness of breath and wheezing.    Cardiovascular: Negative for chest pain, palpitations and leg swelling.   Gastrointestinal: Negative for abdominal distention, abdominal pain, anal bleeding, blood in stool, constipation, diarrhea, nausea, rectal pain and vomiting.   Endocrine: Negative for cold intolerance, heat intolerance, polydipsia, polyphagia and polyuria.   Genitourinary: Negative for difficulty urinating, dysuria, flank pain, frequency, hematuria and urgency.   Musculoskeletal: Positive for back pain. Negative for arthralgias, gait problem, joint swelling, myalgias and neck pain.   Skin: Negative for rash.   Neurological: Negative for dizziness, tremors, seizures, syncope, speech difficulty, weakness, light-headedness, numbness and headaches.   Psychiatric/Behavioral: Negative for behavioral problems, confusion, decreased concentration, dysphoric mood, sleep disturbance and suicidal ideas. The patient is not nervous/anxious and is not hyperactive.        Objective:     Vitals:    07/13/18 1102   BP: (!) 90/53   Pulse: 73   Temp: 99 °F (37.2 °C)   TempSrc: Oral   SpO2: 97%   Weight: 60.5 kg (133 lb 6.1 oz)   Height: 5' 8" (1.727 m)   PainSc:   8   PainLoc: Back     Body mass index is 20.28 kg/m².    Physical Exam   Constitutional: She is oriented to person, place, and time. She appears well-developed and well-nourished. She is cooperative. She does not have a sickly appearance. No distress.   HENT:   Head: Normocephalic and atraumatic.   Right Ear: Hearing, tympanic " membrane, external ear and ear canal normal. No tenderness.   Left Ear: Hearing, tympanic membrane, external ear and ear canal normal. No tenderness.   Nose: Nose normal.   Mouth/Throat: Oropharynx is clear and moist. Normal dentition. No oropharyngeal exudate, posterior oropharyngeal edema or posterior oropharyngeal erythema.   Eyes: Conjunctivae and lids are normal. Right eye exhibits no discharge. Left eye exhibits no discharge. Right conjunctiva is not injected. Left conjunctiva is not injected. No scleral icterus. Right eye exhibits normal extraocular motion. Left eye exhibits normal extraocular motion.   Neck: Normal range of motion. Neck supple. No JVD present. Carotid bruit is not present. No tracheal deviation and no edema present. No thyromegaly present.   Cardiovascular: Normal rate, regular rhythm, normal heart sounds and normal pulses.  Exam reveals no friction rub.    No murmur heard.  Pulmonary/Chest: Effort normal and breath sounds normal. No accessory muscle usage. No respiratory distress. She has no wheezes. She has no rhonchi. She has no rales.   Musculoskeletal: She exhibits no edema.        Lumbar back: She exhibits bony tenderness and pain. She exhibits normal range of motion, no tenderness, no swelling, no edema and no deformity.   Lymphadenopathy:        Head (right side): No submandibular adenopathy present.        Head (left side): No submandibular adenopathy present.     She has no cervical adenopathy.   Neurological: She is alert and oriented to person, place, and time.   Skin: Skin is warm and dry. She is not diaphoretic.   Psychiatric: Her speech is normal and behavior is normal. Thought content normal. Her mood appears not anxious. Her affect is not angry, not labile and not inappropriate. She does not exhibit a depressed mood.   Nursing note and vitals reviewed.      Assessment and Plan:   No was seen today for low-back pain.    Diagnoses and all orders for this visit:    Acute  bilateral low back pain without sciatica  -     X-Ray Lumbar Complete With Flex And Ext; Future    Tetralogy of Fallot s/p repair    Other orders  -     HYDROcodone-acetaminophen (NORCO) 7.5-325 mg per tablet; 1/2 - 1 po q 4 hrs prn severe pain. Do not take and drive.  -     ketorolac injection 60 mg; Inject 2 mLs (60 mg total) into the muscle one time.  -     methylPREDNISolone (MEDROL DOSEPACK) 4 mg tablet; use as directed  -     cyclobenzaprine (FLEXERIL) 5 MG tablet; Take 1 tablet (5 mg total) by mouth nightly. 1-2 po qhs prn muscle spasms for 10 days  -     baclofen (LIORESAL) 10 MG tablet; Take 1 tablet (10 mg total) by mouth 3 (three) times daily. Prn muscle spasms. May cause drowsiness        Follow-up in about 2 weeks (around 7/27/2018).    THIS NOTE WILL BE SHARED WITH THE PATIENT.

## 2018-09-05 ENCOUNTER — LAB VISIT (OUTPATIENT)
Dept: LAB | Facility: HOSPITAL | Age: 28
End: 2018-09-05
Attending: FAMILY MEDICINE
Payer: COMMERCIAL

## 2018-09-05 ENCOUNTER — OFFICE VISIT (OUTPATIENT)
Dept: INTERNAL MEDICINE | Facility: CLINIC | Age: 28
End: 2018-09-05
Payer: COMMERCIAL

## 2018-09-05 VITALS
HEIGHT: 68 IN | BODY MASS INDEX: 20.34 KG/M2 | HEART RATE: 69 BPM | DIASTOLIC BLOOD PRESSURE: 56 MMHG | SYSTOLIC BLOOD PRESSURE: 102 MMHG | OXYGEN SATURATION: 97 % | TEMPERATURE: 99 F | WEIGHT: 134.25 LBS

## 2018-09-05 DIAGNOSIS — G89.29 CHRONIC LOW BACK PAIN, UNSPECIFIED BACK PAIN LATERALITY, WITH SCIATICA PRESENCE UNSPECIFIED: Primary | ICD-10-CM

## 2018-09-05 DIAGNOSIS — G89.29 CHRONIC LOW BACK PAIN, UNSPECIFIED BACK PAIN LATERALITY, WITH SCIATICA PRESENCE UNSPECIFIED: ICD-10-CM

## 2018-09-05 DIAGNOSIS — M54.5 CHRONIC LOW BACK PAIN, UNSPECIFIED BACK PAIN LATERALITY, WITH SCIATICA PRESENCE UNSPECIFIED: ICD-10-CM

## 2018-09-05 DIAGNOSIS — M54.5 CHRONIC LOW BACK PAIN, UNSPECIFIED BACK PAIN LATERALITY, WITH SCIATICA PRESENCE UNSPECIFIED: Primary | ICD-10-CM

## 2018-09-05 LAB
ANION GAP SERPL CALC-SCNC: 5 MMOL/L
BUN SERPL-MCNC: 10 MG/DL
CALCIUM SERPL-MCNC: 9.8 MG/DL
CHLORIDE SERPL-SCNC: 109 MMOL/L
CO2 SERPL-SCNC: 26 MMOL/L
CREAT SERPL-MCNC: 0.9 MG/DL
EST. GFR  (AFRICAN AMERICAN): >60 ML/MIN/1.73 M^2
EST. GFR  (NON AFRICAN AMERICAN): >60 ML/MIN/1.73 M^2
GLUCOSE SERPL-MCNC: 69 MG/DL
POTASSIUM SERPL-SCNC: 3.8 MMOL/L
SODIUM SERPL-SCNC: 140 MMOL/L

## 2018-09-05 PROCEDURE — 99999 PR PBB SHADOW E&M-EST. PATIENT-LVL III: CPT | Mod: PBBFAC,,, | Performed by: FAMILY MEDICINE

## 2018-09-05 PROCEDURE — 3008F BODY MASS INDEX DOCD: CPT | Mod: CPTII,S$GLB,, | Performed by: FAMILY MEDICINE

## 2018-09-05 PROCEDURE — 80048 BASIC METABOLIC PNL TOTAL CA: CPT

## 2018-09-05 PROCEDURE — 99213 OFFICE O/P EST LOW 20 MIN: CPT | Mod: S$GLB,,, | Performed by: FAMILY MEDICINE

## 2018-09-05 PROCEDURE — 36415 COLL VENOUS BLD VENIPUNCTURE: CPT | Mod: PO

## 2018-09-05 RX ORDER — CYCLOBENZAPRINE HCL 10 MG
10 TABLET ORAL NIGHTLY
Qty: 30 TABLET | Refills: 1 | Status: SHIPPED | OUTPATIENT
Start: 2018-09-05 | End: 2018-09-15

## 2018-09-05 RX ORDER — METHOCARBAMOL 500 MG/1
TABLET, FILM COATED ORAL
Qty: 40 TABLET | Refills: 1 | Status: SHIPPED | OUTPATIENT
Start: 2018-09-05 | End: 2019-02-12

## 2018-09-05 NOTE — PROGRESS NOTES
Subjective:   Patient ID: No Harris is a 28 y.o. female.    Chief Complaint: Back Pain      HPI  29 yo f with history of lower back pain is now sp conservative tx with rest, anti-inflammatories and muscle relaxing meds. She had some improvement in the pain but now it has resumed. No injureis.    Patient queried and denies any further complaints.      ALLERGIES AND MEDICATIONS: updated and reviewed.  Review of patient's allergies indicates:   Allergen Reactions    Morphine     Tramadol     Vicodin [hydrocodone-acetaminophen]        Current Outpatient Medications:     acyclovir (ZOVIRAX) 400 MG tablet, Take 1 tablet (400 mg total) by mouth 2 (two) times daily., Disp: 60 tablet, Rfl: 11    baclofen (LIORESAL) 10 MG tablet, Take 1 tablet (10 mg total) by mouth 3 (three) times daily. Prn muscle spasms. May cause drowsiness, Disp: 30 tablet, Rfl: 2    cetirizine (ZYRTEC) 5 MG tablet, Take 5 mg by mouth once daily., Disp: , Rfl:     HYDROcodone-acetaminophen (NORCO) 7.5-325 mg per tablet, 1/2 - 1 po q 4 hrs prn severe pain. Do not take and drive., Disp: 21 tablet, Rfl: 0    levalbuterol (XOPENEX HFA) 45 mcg/actuation inhaler, 1 PUFF AS NEEDED EVERY 4 HRS INHALATION, Disp: 15 g, Rfl: 1    norethindrone-ethinyl estradiol-iron (GILDESS FE 1.5/30, 28,) 1.5 mg-30 mcg (21)/75 mg (7) tablet, Take 1 tablet by mouth once daily., Disp: 84 tablet, Rfl: 3    cyclobenzaprine (FLEXERIL) 10 MG tablet, Take 1 tablet (10 mg total) by mouth every evening. for 10 days, Disp: 30 tablet, Rfl: 1    diphenhydrAMINE (BENADRYL) 25 mg capsule, Take 25 mg by mouth every 6 (six) hours as needed for Itching., Disp: , Rfl:     methocarbamol (ROBAXIN) 500 MG Tab, 2 tabs po tid x 2 days then 1 po tid for 3-10 total days, Disp: 40 tablet, Rfl: 1    triamcinolone acetonide 0.1% (KENALOG) 0.1 % paste, APPLY 1 APPLICATION TO AFFECTED AREA AS NEEDED FOUR TIMES A DAY MOUTH/THROAT, Disp: , Rfl: 0    Review of Systems   Constitutional: Negative  "for activity change, appetite change, chills, diaphoresis, fatigue, fever and unexpected weight change.   HENT: Negative for congestion, ear discharge, ear pain, facial swelling, hearing loss, nosebleeds, postnasal drip, rhinorrhea, sinus pressure, sneezing, sore throat, tinnitus, trouble swallowing and voice change.    Eyes: Negative for photophobia, pain, discharge, redness, itching and visual disturbance.   Respiratory: Negative for cough, chest tightness, shortness of breath and wheezing.    Cardiovascular: Negative for chest pain, palpitations and leg swelling.   Gastrointestinal: Negative for abdominal distention, abdominal pain, anal bleeding, blood in stool, constipation, diarrhea, nausea, rectal pain and vomiting.   Endocrine: Negative for cold intolerance, heat intolerance, polydipsia, polyphagia and polyuria.   Genitourinary: Negative for difficulty urinating, dysuria, flank pain and frequency.   Musculoskeletal: Positive for back pain. Negative for arthralgias, joint swelling, myalgias and neck pain.   Skin: Negative for rash.   Neurological: Negative for dizziness, tremors, seizures, syncope, speech difficulty, weakness, light-headedness, numbness and headaches.   Psychiatric/Behavioral: Negative for behavioral problems, confusion, decreased concentration, dysphoric mood, sleep disturbance and suicidal ideas. The patient is not nervous/anxious and is not hyperactive.        Objective:     Vitals:    09/05/18 1113   BP: (!) 102/56   Pulse: 69   Temp: 99.2 °F (37.3 °C)   TempSrc: Oral   SpO2: 97%   Weight: 60.9 kg (134 lb 4.2 oz)   Height: 5' 8" (1.727 m)   PainSc:   6   PainLoc: Back     Body mass index is 20.41 kg/m².    Physical Exam   Constitutional: She appears well-developed and well-nourished.   Cardiovascular: Normal rate and regular rhythm.   Murmur heard.   Systolic murmur is present with a grade of 4/6.  Vitals reviewed.      Assessment and Plan:   No was seen today for back " pain.    Diagnoses and all orders for this visit:    Chronic low back pain, unspecified back pain laterality, with sciatica presence unspecified  -     Ambulatory consult to Physical Therapy  -     Basic metabolic panel; Future  -     CT Lumbar Spine W Wo Contrast; Future    Other orders  -     cyclobenzaprine (FLEXERIL) 10 MG tablet; Take 1 tablet (10 mg total) by mouth every evening. for 10 days  -     methocarbamol (ROBAXIN) 500 MG Tab; 2 tabs po tid x 2 days then 1 po tid for 3-10 total days        No Follow-up on file.    THIS NOTE WILL BE SHARED WITH THE PATIENT.

## 2018-09-06 PROBLEM — G89.29 CHRONIC LOW BACK PAIN: Status: ACTIVE | Noted: 2018-09-06

## 2018-09-06 PROBLEM — R01.1 HEART MURMUR: Status: ACTIVE | Noted: 2018-09-06

## 2018-09-06 PROBLEM — M54.50 CHRONIC LOW BACK PAIN: Status: ACTIVE | Noted: 2018-09-06

## 2018-09-06 PROBLEM — R01.1 HEART MURMUR: Chronic | Status: ACTIVE | Noted: 2018-09-06

## 2018-09-07 ENCOUNTER — HOSPITAL ENCOUNTER (OUTPATIENT)
Dept: RADIOLOGY | Facility: HOSPITAL | Age: 28
Discharge: HOME | End: 2018-09-07
Attending: FAMILY MEDICINE
Payer: COMMERCIAL

## 2018-09-07 ENCOUNTER — PATIENT MESSAGE (OUTPATIENT)
Dept: INTERNAL MEDICINE | Facility: CLINIC | Age: 28
End: 2018-09-07

## 2018-09-07 DIAGNOSIS — M54.5 CHRONIC LOW BACK PAIN, UNSPECIFIED BACK PAIN LATERALITY, WITH SCIATICA PRESENCE UNSPECIFIED: ICD-10-CM

## 2018-09-07 DIAGNOSIS — M48.061 SPINAL STENOSIS OF LUMBAR REGION, UNSPECIFIED WHETHER NEUROGENIC CLAUDICATION PRESENT: Primary | ICD-10-CM

## 2018-09-07 DIAGNOSIS — G89.29 CHRONIC LOW BACK PAIN, UNSPECIFIED BACK PAIN LATERALITY, WITH SCIATICA PRESENCE UNSPECIFIED: ICD-10-CM

## 2018-09-07 PROCEDURE — 72131 CT LUMBAR SPINE W/O DYE: CPT | Mod: 26,,, | Performed by: RADIOLOGY

## 2018-09-07 PROCEDURE — 72131 CT LUMBAR SPINE W/O DYE: CPT | Mod: TC

## 2018-09-18 ENCOUNTER — CLINICAL SUPPORT (OUTPATIENT)
Dept: REHABILITATION | Facility: HOSPITAL | Age: 28
End: 2018-09-18
Attending: FAMILY MEDICINE
Payer: COMMERCIAL

## 2018-09-18 DIAGNOSIS — G89.29 CHRONIC MIDLINE LOW BACK PAIN WITHOUT SCIATICA: ICD-10-CM

## 2018-09-18 DIAGNOSIS — M54.50 CHRONIC MIDLINE LOW BACK PAIN WITHOUT SCIATICA: ICD-10-CM

## 2018-09-18 PROCEDURE — 97110 THERAPEUTIC EXERCISES: CPT | Mod: PO

## 2018-09-18 PROCEDURE — 97161 PT EVAL LOW COMPLEX 20 MIN: CPT | Mod: PO

## 2018-09-19 NOTE — PLAN OF CARE
[facility]  Physical Therapy Initial Evaluation    Name: No Harris  Clinic Number: [id number]    Therapy Diagnosis:   Encounter Diagnosis   Name Primary?    Chronic midline low back pain without sciatica      Physician: Edy Hayes MD    Physician Orders: PT Eval and Treat low back pain  Medical Diagnosis from Referral: Chronic low back pain  Evaluation Date: 9/18/2018  Authorization Period Expiration: 12/31/18  Plan of Care Expiration: 12/1/18  Visit # / Visits authorized: 1/ 20    Time In: 3:15 (Pt w/ late arrival)  Time Out: 4:00  Total Billable Time: 60 minutes    Precautions: Standard and s/p tetralogy of Fallot repair (4 open heart surgeries last one in 2005; reports she still feels guarded/ the need to protect central chest incision; tolerates prone but be mindful)    Subjective   Date of onset: Since she can remember     History of current condition - No reports: she has had LBP as long as she can remember however over the past 7 years it has gotten progressively worse and it was severely exacerbated in July 2018. 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 Pt reports in July her dance partner was too aggressive with dance move and made her pain much worse. She was using 1/2 hydrocodone pill every night to slee (now is down to once/ week), pt has been trying to stick with muscle relaxers to decrease her pain. Pt reports pain is better in the morning vs night, sitting is better than standing, side sleeping is better than supine sleeping. Pt reports active lumbar extension is the worst pain she experiences. Pt reports at a recent lumbar x-ray they discovered she has a sixth lumbar vertebrae. Pt denies radicular sx. Pt reports swing dancing keeps her sane so she is unwilling to take a break, reports she is  uninterested in other forms of physical activity at this time. Pt would like to control her pain without use of prescription, be able to swing dance and wake up and go through her day without constant back pain     Past Medical History:   Diagnosis Date    Anemia     Childhood asthma     Environmental allergies     Fever blister     recurrent    Tetralogy of Fallot s/p repair     w/ valve replacement     No Harris  has a past surgical history that includes Open heart surgery; Breast surgery (Right, 2016); Retinal laser procedure; Eye surgery; Cardiac valve replacement; and Breast reconstruction.    No has a current medication list which includes the following prescription(s): acyclovir, baclofen, cetirizine, diphenhydramine, hydrocodone-acetaminophen, levalbuterol, methocarbamol, norethindrone-ethinyl estradiol-iron, and triamcinolone acetonide 0.1%.    Review of patient's allergies indicates:   Allergen Reactions    Morphine     Tramadol     Vicodin [hydrocodone-acetaminophen]         Imaging, X-ray: 7/13/18  No significant abnormality.  Incidental note made of 6 lumbar type vertebral segments with the lowermost segment being transitional in nature and having its left transverse process articulating with the sacrum.    CT scane 9/5/18  Suspect small disc extrusion posterior to S1 resulting in moderate spinal canal stenosis.    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 [employer]  Prior Level of Function: Pt has long standing hx of low back pain but was able to work through it  Current Level of Function: Pt's low back pain has be come debilitating, difficulty tolerating full day of work    Pain:  Current 5/10, worst 10/10, best 4/10   Location: Mid low back pain   Description: Aching, Dull, Sharp and Shooting  Aggravating Factors: Sitting, Standing, Laying, Bending, Night Time, Morning, Extension, Flexing and Lifting  Easing Factors: pain medication, ice and  hot bath    Pts goals: Not take muscle relaxers, dance and be able to tolerate full work day w/out pain    Objective     LUMBAR SPINE AROM:   Flexion: WNL 2/10 P!   Extension: 60% 6/10 P!   Left Sidebend: WNL   Right Sidebend: WNL   Left Rotation: WNL   Right Rotation: WNL     Hip PROM: Above normal limits in all hip motion with exception of hip extension = 5 deg bilat    LOWER EXTREMITY MMT   Left Right     Hip flex 5/5 5/5   Hip ext 4-/5 P! 4+/5    Hip IR 4+/5 4+/5   Hip ER 4/5 4/5   Hip Abd 4-/5 P! 4+/5     Special Tests:   Left Right   Slump (-) (-)   SLR (-) (-)   ASLR (+) LBP bilat  QING/FADDIR (-)  Compression/gapping (-)  Sacral spring (+)  PA glides: L6-L3 (+) for pain  Prone instability test (+); PA glides painless    TREATMENT   Treatment Time In: 3:55  Treatment Time Out: 4:10  Total Treatment time separate from Evaluation: 15 minutes    No received therapeutic exercises to develop strength, endurance, ROM, flexibility and posture for 15 minutes including:    Pelvic tilting 2x20  Bridge 2x10  TrA march 2x10  2# DB fallouts w/ TrA act 2x10  Bird dog w/ GTB 2x10    Home Exercises and Patient Education Provided    Education provided re: Activity modification, anatomy, core strengthening    Written Home Exercises Provided: All exercises performed during today's treatment were printed and given to pt.  Exercises were reviewed and No was able to demonstrate them prior to the end of the session.   Pt received a written copy of exercises to perform at home. No demonstrated good  understanding of the education provided.     Assessment   No is a 28 y.o. female referred to outpatient Physical Therapy with a medical diagnosis of low back pain. Pt presents with decreased strength, decreased ROM, decreased flexibility, lumbar instability, decreased core strenght, and increased pain. Due to impairments, pt is unable to dance without pain, tolerate full day of work or sleep comfortably without pain..      Pt prognosis is Good.   Pt will benefit from skilled outpatient Physical Therapy to address the deficits stated above and in the chart below, provide pt/family education, and to maximize pt's level of independence.     Plan of care discussed with patient: Yes  Pt's spiritual, cultural and educational needs considered and patient is agreeable to the plan of care and goals as stated below:     Anticipated Barriers for therapy: chronicity of symptoms    Medical Necessity is demonstrated by the following  History  Co-morbidities and personal factors that may impact the plan of care Co-morbidities:   hx of open heart surgery (continued guarding)    Personal Factors:   character  attitudes     low   Examination  Body Structures and Functions, activity limitations and participation restrictions that may impact the plan of care Body Regions:   back    Body Systems:    ROM  strength    Participation Restrictions:   Dancing    Activity limitations:   Learning and applying knowledge  no deficits    General Tasks and Commands  no deficits    Communication  no deficits    Mobility  lifting and carrying objects    Self care  no deficits    Domestic Life  no deficits    Interactions/Relationships  no deficits    Life Areas  no deficits    Community and Social Life  no deficits         low   Clinical Presentation stable and uncomplicated low   Decision Making/ Complexity Score: low     Goals:  Short Term Goals: 3 weeks   1. Pt will be able demonstrate pain free MMT of glute (extension/ abduction) on L with at least 4/5 strength to demonstrate improved hip strength/ function  2. Pt will be able to demonstrate good transverse abdominus recruitment/ bracing during functional activities to demonstrate improved core stability (bracing with 5 anti rotation dynamic lunge/ squats motions reporting no low back pain bilat)  3. Pt will be able to participate in swing dance class making modifications when able reporting less than 4/10 LBP  the following day    Long Term Goals: 10 weeks   1. Pt will be able to performing 10 ASLR bilat reporting 0/10 LBP to demonstrate improved core stability  2. Pt will demonstrate at least 10 degrees of passive and active hip extension bilat without demonstrating lumbar hyperextension   3. Pt will be able to tolerate one full week of swing dancing class reporting less than 3/10 LBP   4. Pt will report participating in other forms of physical activity aside from swing dancing to avoid overuse injuries and maintain good muscular balance    Plan   Plan of care Certification: 9/18/2018 to 12/1/18.    Outpatient Physical Therapy 2 times weekly for 10 weeks to include the following interventions: Cervical/Lumbar Traction, Electrical Stimulation TENS, Gait Training, Manual Therapy, Moist Heat/ Ice, Neuromuscular Re-ed, Patient Education, Self Care, Therapeutic Activites and Therapeutic Exercise.     Chuckie Mast, PT

## 2018-10-01 ENCOUNTER — TELEPHONE (OUTPATIENT)
Dept: ORTHOPEDICS | Facility: CLINIC | Age: 28
End: 2018-10-01

## 2018-10-01 NOTE — TELEPHONE ENCOUNTER
----- Message from Roxana Varma sent at 10/1/2018  4:10 PM CDT -----  Contact: patient  Attn Siva  Please call pt at [phone]. Returning your call     Thank you

## 2018-10-03 ENCOUNTER — CLINICAL SUPPORT (OUTPATIENT)
Dept: REHABILITATION | Facility: HOSPITAL | Age: 28
End: 2018-10-03
Attending: FAMILY MEDICINE
Payer: COMMERCIAL

## 2018-10-03 DIAGNOSIS — G89.29 CHRONIC MIDLINE LOW BACK PAIN WITHOUT SCIATICA: ICD-10-CM

## 2018-10-03 DIAGNOSIS — M54.50 CHRONIC MIDLINE LOW BACK PAIN WITHOUT SCIATICA: ICD-10-CM

## 2018-10-03 PROCEDURE — 97140 MANUAL THERAPY 1/> REGIONS: CPT

## 2018-10-03 PROCEDURE — 97110 THERAPEUTIC EXERCISES: CPT

## 2018-10-03 NOTE — PROGRESS NOTES
Physical Therapy Daily Treatment Note     Name: No Harris  Clinic Number: [id number]    Therapy Diagnosis:   Encounter Diagnosis   Name Primary?    Chronic midline low back pain without sciatica      Physician: Edy Hayes MD    Visit Date: 10/3/2018  Physician Orders: PT Eval and Treat low back pain  Medical Diagnosis from Referral: Chronic low back pain  Evaluation Date: 9/18/2018  Authorization Period Expiration: 12/31/18  Plan of Care Expiration: 12/1/18  Visit # / Visits authorized: 1/ 20     Time In: 1110  Time Out: 1210  Total Billable Time: 60 minutes     Precautions: Standard and s/p tetralogy of Fallot repair (4 open heart surgeries last one in 2005; reports she still feels guarded/ the need to protect central chest incision; tolerates prone but be mindful)    Subjective     Pt reports: First visit went fine for her symptoms however the PT did not come across as very nice that why she transferred .    She was compliant with home exercise program.  Response to previous treatment: some improvement in symptoms.  Functional change: nil    Pain: no number verbalized  Location: bilateral back      Objective     No received therapeutic exercises to develop strength, endurance, ROM, flexibility, posture and core stabilization for 40 minutes including:  bike  Pelvic tilting 2x20  Bridge 2x10  TrA march 2x10  2# DB fallouts w/ TrA act 2x10  Bird dog w/ GTB 2x10- NP  Clams 20    No received the following manual therapy techniques: Joint mobilizations and Soft tissue Mobilization were applied to the: low back for 20 minutes, including:  HS stretch  Piriformis  glute stretch  grd 1 PA glide L1-L5  Hip flexor stretch  STM erector, QL    Home Exercises Provided and Patient Education Provided     Education provided:   - Importance of HEP and use of ice to manage symptoms.    Written Home Exercises Provided: yes.  Exercises were reviewed and No was able to demonstrate them prior to the end of the  session.  No demonstrated good  understanding of the education provided.     See EMR under Patient Instructions for exercises provided 10/3/2018.    Assessment     PT showed good tolerance for therex. She did have some discomfort with bridging however with cues improved form. Pt was pleased with PT session. She expressed dislike with her IE at the [facility]. Pt given HEP to improve strength and mobility. Pt showed good understanding.   No is progressing well towards her goals.   Pt prognosis is Excellent.     Pt will continue to benefit from skilled outpatient physical therapy to address the deficits listed in the problem list box on initial evaluation, provide pt/family education and to maximize pt's level of independence in the home and community environment.     Pt's spiritual, cultural and educational needs considered and pt agreeable to plan of care and goals.    Anticipated barriers to physical therapy:     Goals: Short Term Goals: 3 weeks   1. Pt will be able demonstrate pain free MMT of glute (extension/ abduction) on L with at least 4/5 strength to demonstrate improved hip strength/ function  2. Pt will be able to demonstrate good transverse abdominus recruitment/ bracing during functional activities to demonstrate improved core stability (bracing with 5 anti rotation dynamic lunge/ squats motions reporting no low back pain bilat)  3. Pt will be able to participate in swing dance class making modifications when able reporting less than 4/10 LBP the following day     Long Term Goals: 10 weeks   1. Pt will be able to performing 10 ASLR bilat reporting 0/10 LBP to demonstrate improved core stability  2. Pt will demonstrate at least 10 degrees of passive and active hip extension bilat without demonstrating lumbar hyperextension   3. Pt will be able to tolerate one full week of swing dancing class reporting less than 3/10 LBP   4. Pt will report participating in other forms of physical activity aside  from swing dancing to avoid overuse injuries and maintain good muscular balance    Plan     Progress per POC    Renan Mcwilliams, PT

## 2018-10-10 ENCOUNTER — CLINICAL SUPPORT (OUTPATIENT)
Dept: REHABILITATION | Facility: HOSPITAL | Age: 28
End: 2018-10-10
Attending: FAMILY MEDICINE
Payer: COMMERCIAL

## 2018-10-10 DIAGNOSIS — M54.50 CHRONIC MIDLINE LOW BACK PAIN WITHOUT SCIATICA: ICD-10-CM

## 2018-10-10 DIAGNOSIS — G89.29 CHRONIC MIDLINE LOW BACK PAIN WITHOUT SCIATICA: ICD-10-CM

## 2018-10-10 PROCEDURE — 97110 THERAPEUTIC EXERCISES: CPT

## 2018-10-10 PROCEDURE — 97140 MANUAL THERAPY 1/> REGIONS: CPT

## 2018-10-10 NOTE — PROGRESS NOTES
Physical Therapy Daily Treatment Note     Name: No Harris  Clinic Number: [id number]    Therapy Diagnosis:   Encounter Diagnosis   Name Primary?    Chronic midline low back pain without sciatica      Physician: Edy Hayes MD    Visit Date: 10/10/2018  Physician Orders: PT Eval and Treat low back pain  Medical Diagnosis from Referral: Chronic low back pain  Evaluation Date: 9/18/2018  Authorization Period Expiration: 12/31/18  Plan of Care Expiration: 12/1/18  Visit # / Visits authorized: 1/ 20     Time In: 100  Time Out: 155  Total Billable Time: 55 minutes     Precautions: Standard and s/p tetralogy of Fallot repair (4 open heart surgeries last one in 2005; reports she still feels guarded/ the need to protect central chest incision; tolerates prone but be mindful)    Subjective     Pt reports: Pt reports she did well most of the weekend she was on her feet for 12 hr Saturday and toward the end she was having some pain. She reports she is a little sore today but she did not sleep well which may be the cause of increased symptoms.   She was compliant with home exercise program.  Response to previous treatment: some improvement in symptoms.  Functional change: nil    Pain: no number verbalized  Location: bilateral back      Objective     No received therapeutic exercises to develop strength, endurance, ROM, flexibility, posture and core stabilization for 35 minutes including:  bike  Pelvic tilting 2x20  Bridge 2x10- HOLD  TrA march 2x10  2# DB fallouts w/ TrA act 2x10  Bird dog w/ GTB 2x10- NP  Clams 20  Leg press  Dead bug isos increased soreness in low back - differed    No received the following manual therapy techniques: Joint mobilizations and Soft tissue Mobilization were applied to the: low back for 20 minutes, including:  HS stretch  Piriformis  glute stretch  grd 1 PA glide L1-L5  Hip flexor stretch  STM erector, QL    Home Exercises Provided and Patient Education Provided     Education  provided:   - Importance of HEP and use of ice to manage symptoms.    Written Home Exercises Provided: yes.  Exercises were reviewed and No was able to demonstrate them prior to the end of the session.  No demonstrated good  understanding of the education provided.     See EMR under Patient Instructions for exercises provided 10/3/2018.    Assessment     Pt continues to show weakness in glutes and core. She has difficulty with activiation. Pt diod show improvement in symptoms with distraction and stretching. Trialed KT tape to try to help improve symptoms. Pt needs VC for form. Edited HEP to decrease symptoms with bridges and increase core activation and improve mm length.  No is progressing well towards her goals.   Pt prognosis is Excellent.     Pt will continue to benefit from skilled outpatient physical therapy to address the deficits listed in the problem list box on initial evaluation, provide pt/family education and to maximize pt's level of independence in the home and community environment.     Pt's spiritual, cultural and educational needs considered and pt agreeable to plan of care and goals.    Anticipated barriers to physical therapy:     Goals: Short Term Goals: 3 weeks   1. Pt will be able demonstrate pain free MMT of glute (extension/ abduction) on L with at least 4/5 strength to demonstrate improved hip strength/ function  2. Pt will be able to demonstrate good transverse abdominus recruitment/ bracing during functional activities to demonstrate improved core stability (bracing with 5 anti rotation dynamic lunge/ squats motions reporting no low back pain bilat)  3. Pt will be able to participate in swing dance class making modifications when able reporting less than 4/10 LBP the following day     Long Term Goals: 10 weeks   1. Pt will be able to performing 10 ASLR bilat reporting 0/10 LBP to demonstrate improved core stability  2. Pt will demonstrate at least 10 degrees of passive and  active hip extension bilat without demonstrating lumbar hyperextension   3. Pt will be able to tolerate one full week of swing dancing class reporting less than 3/10 LBP   4. Pt will report participating in other forms of physical activity aside from swing dancing to avoid overuse injuries and maintain good muscular balance    Plan     Progress per CORDELIA Mcwilliams, PT

## 2018-10-12 ENCOUNTER — CLINICAL SUPPORT (OUTPATIENT)
Dept: REHABILITATION | Facility: HOSPITAL | Age: 28
End: 2018-10-12
Attending: FAMILY MEDICINE
Payer: COMMERCIAL

## 2018-10-12 DIAGNOSIS — M54.50 CHRONIC MIDLINE LOW BACK PAIN WITHOUT SCIATICA: ICD-10-CM

## 2018-10-12 DIAGNOSIS — G89.29 CHRONIC MIDLINE LOW BACK PAIN WITHOUT SCIATICA: ICD-10-CM

## 2018-10-12 PROCEDURE — 97110 THERAPEUTIC EXERCISES: CPT

## 2018-10-12 PROCEDURE — 97140 MANUAL THERAPY 1/> REGIONS: CPT

## 2018-10-12 NOTE — PROGRESS NOTES
Physical Therapy Daily Treatment Note     Name: No Harris  Clinic Number: [id number]    Therapy Diagnosis:   Encounter Diagnosis   Name Primary?    Chronic midline low back pain without sciatica      Physician: Edy Hayes MD    Visit Date: 10/12/2018  Physician Orders: PT Eval and Treat low back pain  Medical Diagnosis from Referral: Chronic low back pain  Evaluation Date: 9/18/2018  Authorization Period Expiration: 12/31/18  Plan of Care Expiration: 12/1/18  Visit # / Visits authorized: 4/ 20     Time In: 1100  Time Out: 1205  Total Billable Time: 65minutes     Precautions: Standard and s/p tetralogy of Fallot repair (4 open heart surgeries last one in 2005; reports she still feels guarded/ the need to protect central chest incision; tolerates prone but be mindful)    Subjective     Pt reports: Pt report the tape help some she reported changes to HEP helped as well.    She was compliant with home exercise program.  Response to previous treatment: some improvement in symptoms.  Functional change: nil    Pain: no number verbalized  Location: bilateral back      Objective     No received therapeutic exercises to develop strength, endurance, ROM, flexibility, posture and core stabilization for 35 minutes including:  bike  TA  Clams  TA march  mutlifidi UE holds on SB  Leg press 50    No received the following manual therapy techniques: Joint mobilizations and Soft tissue Mobilization were applied to the: low back for 15 minutes, including:  HS stretch  Piriformis  glute stretch  grd 1 PA glide L1-L5  Hip flexor stretch  STM erector, QL    HP 10 mins    Home Exercises Provided and Patient Education Provided     Education provided:   - Importance of HEP and use of ice to manage symptoms.    Written Home Exercises Provided: yes.  Exercises were reviewed and No was able to demonstrate them prior to the end of the session.  No demonstrated good  understanding of the education provided.     See EMR  under Patient Instructions for exercises provided 10/3/2018.    Assessment     Pt showed good tolerance with therex today. Pt educated on hip weakness and trying to avoid resting her knees on each other to continue to help with hip strength and stabilization. Pt showed good understanding she and no increase symptoms with therex today.    No is progressing well towards her goals.   Pt prognosis is Excellent.     Pt will continue to benefit from skilled outpatient physical therapy to address the deficits listed in the problem list box on initial evaluation, provide pt/family education and to maximize pt's level of independence in the home and community environment.     Pt's spiritual, cultural and educational needs considered and pt agreeable to plan of care and goals.    Anticipated barriers to physical therapy:     Goals: Short Term Goals: 3 weeks   1. Pt will be able demonstrate pain free MMT of glute (extension/ abduction) on L with at least 4/5 strength to demonstrate improved hip strength/ function  2. Pt will be able to demonstrate good transverse abdominus recruitment/ bracing during functional activities to demonstrate improved core stability (bracing with 5 anti rotation dynamic lunge/ squats motions reporting no low back pain bilat)  3. Pt will be able to participate in swing dance class making modifications when able reporting less than 4/10 LBP the following day     Long Term Goals: 10 weeks   1. Pt will be able to performing 10 ASLR bilat reporting 0/10 LBP to demonstrate improved core stability  2. Pt will demonstrate at least 10 degrees of passive and active hip extension bilat without demonstrating lumbar hyperextension   3. Pt will be able to tolerate one full week of swing dancing class reporting less than 3/10 LBP   4. Pt will report participating in other forms of physical activity aside from swing dancing to avoid overuse injuries and maintain good muscular balance    Plan     Progress per  POC    Renan Mcwilliams, PT

## 2018-10-17 ENCOUNTER — CLINICAL SUPPORT (OUTPATIENT)
Dept: REHABILITATION | Facility: HOSPITAL | Age: 28
End: 2018-10-17
Attending: FAMILY MEDICINE
Payer: COMMERCIAL

## 2018-10-17 DIAGNOSIS — G89.29 CHRONIC MIDLINE LOW BACK PAIN WITHOUT SCIATICA: ICD-10-CM

## 2018-10-17 DIAGNOSIS — M54.50 CHRONIC MIDLINE LOW BACK PAIN WITHOUT SCIATICA: ICD-10-CM

## 2018-10-17 PROCEDURE — 97140 MANUAL THERAPY 1/> REGIONS: CPT

## 2018-10-17 PROCEDURE — 97110 THERAPEUTIC EXERCISES: CPT

## 2018-10-17 NOTE — PROGRESS NOTES
Physical Therapy Daily Treatment Note     Name: No Harris  Clinic Number: [id number]    Therapy Diagnosis:   Encounter Diagnosis   Name Primary?    Chronic midline low back pain without sciatica      Physician: Edy Hayes MD    Visit Date: 10/17/2018  Physician Orders: PT Eval and Treat low back pain  Medical Diagnosis from Referral: Chronic low back pain  Evaluation Date: 9/18/2018  Authorization Period Expiration: 12/31/18  Plan of Care Expiration: 12/1/18  Visit # / Visits authorized: 5/ 20     Time In: 1100  Time Out: 1205    Total Billable Time: 65     Precautions: Standard and s/p tetralogy of Fallot repair (4 open heart surgeries last one in 2005; reports she still feels guarded/ the need to protect central chest incision; tolerates prone but be mindful)    Subjective     Pt reports: Pt reports she did a lot yesterday and was sore from increased activity in her back.   She was compliant with home exercise program.  Response to previous treatment: some improvement in symptoms.  Functional change: nil    Pain: no number verbalized  Location: bilateral back      Objective     FOTO 5: 43    No received therapeutic exercises to develop strength, endurance, ROM, flexibility, posture and core stabilization for 40 minutes including:  bike  TA  Clams  TA march  mutlifidi UE holds on SB  Leg press 50  Wall squats  Banded walks  TA lat pull downs    No received the following manual therapy techniques: Joint mobilizations and Soft tissue Mobilization were applied to the: low back for 10 minutes, including:  HS stretch  Piriformis  glute stretch  Hip flexor stretch      HP 10 mins    Home Exercises Provided and Patient Education Provided     Education provided:   - Importance of HEP and use of ice to manage symptoms.    Written Home Exercises Provided: yes.  Exercises were reviewed and No was able to demonstrate them prior to the end of the session.  No demonstrated good  understanding of the  education provided.     See EMR under Patient Instructions for exercises provided 10/3/2018.    Assessment     Pt showed good tolerance for therex. Only mutifidi activation caused slight increased soreness. Pt reported she is having good days and bad days which has been an improvement since therapy.  Pt showed good form with VC. She continues to make progress. Pt reported improved symptoms post session.  No is progressing well towards her goals.   Pt prognosis is Excellent.     Pt will continue to benefit from skilled outpatient physical therapy to address the deficits listed in the problem list box on initial evaluation, provide pt/family education and to maximize pt's level of independence in the home and community environment.     Pt's spiritual, cultural and educational needs considered and pt agreeable to plan of care and goals.    Anticipated barriers to physical therapy:     Goals: Short Term Goals: 3 weeks   1. Pt will be able demonstrate pain free MMT of glute (extension/ abduction) on L with at least 4/5 strength to demonstrate improved hip strength/ function  2. Pt will be able to demonstrate good transverse abdominus recruitment/ bracing during functional activities to demonstrate improved core stability (bracing with 5 anti rotation dynamic lunge/ squats motions reporting no low back pain bilat)  3. Pt will be able to participate in swing dance class making modifications when able reporting less than 4/10 LBP the following day     Long Term Goals: 10 weeks   1. Pt will be able to performing 10 ASLR bilat reporting 0/10 LBP to demonstrate improved core stability  2. Pt will demonstrate at least 10 degrees of passive and active hip extension bilat without demonstrating lumbar hyperextension   3. Pt will be able to tolerate one full week of swing dancing class reporting less than 3/10 LBP   4. Pt will report participating in other forms of physical activity aside from swing dancing to avoid overuse  injuries and maintain good muscular balance    Plan     Progress per POC    Renan Mcwilliams, PT

## 2018-10-19 ENCOUNTER — CLINICAL SUPPORT (OUTPATIENT)
Dept: REHABILITATION | Facility: HOSPITAL | Age: 28
End: 2018-10-19
Attending: FAMILY MEDICINE
Payer: COMMERCIAL

## 2018-10-19 DIAGNOSIS — G89.29 CHRONIC MIDLINE LOW BACK PAIN WITHOUT SCIATICA: ICD-10-CM

## 2018-10-19 DIAGNOSIS — M54.50 CHRONIC MIDLINE LOW BACK PAIN WITHOUT SCIATICA: ICD-10-CM

## 2018-10-19 PROCEDURE — 97140 MANUAL THERAPY 1/> REGIONS: CPT

## 2018-10-19 PROCEDURE — 97110 THERAPEUTIC EXERCISES: CPT

## 2018-10-19 NOTE — PROGRESS NOTES
Physical Therapy Daily Treatment Note     Name: No Harris  Clinic Number: [id number]    Therapy Diagnosis:   No diagnosis found.  Physician: Edy Hayes MD    Visit Date: 10/19/2018  Physician Orders: PT Eval and Treat low back pain  Medical Diagnosis from Referral: Chronic low back pain  Evaluation Date: 9/18/2018  Authorization Period Expiration: 12/31/18  Plan of Care Expiration: 12/1/18  Visit # / Visits authorized: 5/ 20     Time In: 1100  Time Out: 1205    Total Billable Time: 65     Precautions: Standard and s/p tetralogy of Fallot repair (4 open heart surgeries last one in 2005; reports she still feels guarded/ the need to protect central chest incision; tolerates prone but be mindful)    Subjective     Pt reports: Pt reports she did a lot yesterday and was sore from increased activity in her back.   She was compliant with home exercise program.  Response to previous treatment: some improvement in symptoms.  Functional change: nil    Pain: no number verbalized  Location: bilateral back      Objective     FOTO 5: 43    No received therapeutic exercises to develop strength, endurance, ROM, flexibility, posture and core stabilization for 40 minutes including:  bike  TA  Clams  TA march  mutlifidi UE holds on SB  Leg press 50  Wall squats  Banded walks  TA lat pull downs- NP      No received the following manual therapy techniques: Joint mobilizations and Soft tissue Mobilization were applied to the: low back for 10 minutes, including:  HS stretch  Piriformis  glute stretch  Hip flexor stretch      HP 10 mins    Home Exercises Provided and Patient Education Provided     Education provided:   - Importance of HEP and use of ice to manage symptoms.    Written Home Exercises Provided: yes.  Exercises were reviewed and No was able to demonstrate them prior to the end of the session.  No demonstrated good  understanding of the education provided.     See EMR under Patient Instructions for  exercises provided 10/3/2018.    Assessment     Pt showed good tolerance for therex. Only mutifidi activation caused slight increased soreness. She cleaned yesterday which increased soreness overall. Pt continue to show fatigue quickly which Is causing some soreness. Overall pt shows good progress. She had no increase in symptoms post session   No is progressing well towards her goals.   Pt prognosis is Excellent.     Pt will continue to benefit from skilled outpatient physical therapy to address the deficits listed in the problem list box on initial evaluation, provide pt/family education and to maximize pt's level of independence in the home and community environment.     Pt's spiritual, cultural and educational needs considered and pt agreeable to plan of care and goals.    Anticipated barriers to physical therapy:     Goals: Short Term Goals: 3 weeks   1. Pt will be able demonstrate pain free MMT of glute (extension/ abduction) on L with at least 4/5 strength to demonstrate improved hip strength/ function  2. Pt will be able to demonstrate good transverse abdominus recruitment/ bracing during functional activities to demonstrate improved core stability (bracing with 5 anti rotation dynamic lunge/ squats motions reporting no low back pain bilat)  3. Pt will be able to participate in swing dance class making modifications when able reporting less than 4/10 LBP the following day     Long Term Goals: 10 weeks   1. Pt will be able to performing 10 ASLR bilat reporting 0/10 LBP to demonstrate improved core stability  2. Pt will demonstrate at least 10 degrees of passive and active hip extension bilat without demonstrating lumbar hyperextension   3. Pt will be able to tolerate one full week of swing dancing class reporting less than 3/10 LBP   4. Pt will report participating in other forms of physical activity aside from swing dancing to avoid overuse injuries and maintain good muscular balance    Plan     Progress  per POC    Renan Mcwilliams, PT

## 2018-10-24 ENCOUNTER — CLINICAL SUPPORT (OUTPATIENT)
Dept: REHABILITATION | Facility: HOSPITAL | Age: 28
End: 2018-10-24
Attending: FAMILY MEDICINE
Payer: COMMERCIAL

## 2018-10-24 DIAGNOSIS — M54.50 CHRONIC MIDLINE LOW BACK PAIN WITHOUT SCIATICA: ICD-10-CM

## 2018-10-24 DIAGNOSIS — G89.29 CHRONIC MIDLINE LOW BACK PAIN WITHOUT SCIATICA: ICD-10-CM

## 2018-10-24 PROCEDURE — 97110 THERAPEUTIC EXERCISES: CPT

## 2018-10-24 PROCEDURE — 97140 MANUAL THERAPY 1/> REGIONS: CPT

## 2018-10-24 NOTE — PROGRESS NOTES
"  Physical Therapy Daily Treatment Note     Name: No Harris  Clinic Number: [id number]    Therapy Diagnosis:   Encounter Diagnosis   Name Primary?    Chronic midline low back pain without sciatica      Physician: Edy Hayes MD    Visit Date: 10/24/2018  Physician Orders: PT Eval and Treat low back pain  Medical Diagnosis from Referral: Chronic low back pain  Evaluation Date: 9/18/2018  Authorization Period Expiration: 12/31/18  Plan of Care Expiration: 12/1/18  Visit # / Visits authorized: 7/ 20     Time In: 1100  Time Out: 1200    Total Billable Time:55     Precautions: Standard and s/p tetralogy of Fallot repair (4 open heart surgeries last one in 2005; reports she still feels guarded/ the need to protect central chest incision; tolerates prone but be mindful)    Subjective     Pt reports: chronic pain with LB, min pain this morning.   She was compliant with home exercise program.  Response to previous treatment: soreness  Functional change: no change at present time     Pain: no number verbalized  Location: bilateral back      Objective     FOTO 5: 43    No received therapeutic exercises to develop strength, endurance, ROM, flexibility, posture and core stabilization for 40 minutes including:    Moist heat for 10' LB for increased circulation, and tissue healing -Performing TB DKTC and LTR  TA 2x10/5"  TA Marching 2'  OTB Calm shell 20x ea  mutlifidi UE holds on SB  Leg press 100# 2x10  OTB side stepping 1 lap  B SL hip abd 2x10  Wall squats 2x10    TA lat pull downs- NP    No received the following manual therapy techniques: Joint mobilizations and Soft tissue Mobilization were applied to the: low back for 15 minutes, including:  HS stretch  Piriformis  glute stretch  Hip flexor stretch      Home Exercises Provided and Patient Education Provided     Education provided:   - Importance of HEP and use of ice to manage symptoms.    Written Home Exercises Provided: yes.  Exercises were reviewed and " No was able to demonstrate them prior to the end of the session.  No demonstrated good  understanding of the education provided.         Assessment     Pt tolerating tx well with no increased LB. Continue to progress as tolerated      No is progressing well towards her goals.   Pt prognosis is Excellent.     Pt will continue to benefit from skilled outpatient physical therapy to address the deficits listed in the problem list box on initial evaluation, provide pt/family education and to maximize pt's level of independence in the home and community environment.     Pt's spiritual, cultural and educational needs considered and pt agreeable to plan of care and goals.    Anticipated barriers to physical therapy:     Goals: Short Term Goals: 3 weeks   1. Pt will be able demonstrate pain free MMT of glute (extension/ abduction) on L with at least 4/5 strength to demonstrate improved hip strength/ function  2. Pt will be able to demonstrate good transverse abdominus recruitment/ bracing during functional activities to demonstrate improved core stability (bracing with 5 anti rotation dynamic lunge/ squats motions reporting no low back pain bilat)  3. Pt will be able to participate in swing dance class making modifications when able reporting less than 4/10 LBP the following day     Long Term Goals: 10 weeks   1. Pt will be able to performing 10 ASLR bilat reporting 0/10 LBP to demonstrate improved core stability  2. Pt will demonstrate at least 10 degrees of passive and active hip extension bilat without demonstrating lumbar hyperextension   3. Pt will be able to tolerate one full week of swing dancing class reporting less than 3/10 LBP   4. Pt will report participating in other forms of physical activity aside from swing dancing to avoid overuse injuries and maintain good muscular balance    Plan     Progress per POC- core and Le strengthening     Nadeem Wiley, PTA, STS

## 2018-10-29 ENCOUNTER — CLINICAL SUPPORT (OUTPATIENT)
Dept: REHABILITATION | Facility: HOSPITAL | Age: 28
End: 2018-10-29
Attending: FAMILY MEDICINE
Payer: COMMERCIAL

## 2018-10-29 DIAGNOSIS — G89.29 CHRONIC MIDLINE LOW BACK PAIN WITHOUT SCIATICA: ICD-10-CM

## 2018-10-29 DIAGNOSIS — M54.50 CHRONIC MIDLINE LOW BACK PAIN WITHOUT SCIATICA: ICD-10-CM

## 2018-10-29 PROCEDURE — 97110 THERAPEUTIC EXERCISES: CPT

## 2018-10-29 PROCEDURE — 97140 MANUAL THERAPY 1/> REGIONS: CPT

## 2018-10-29 NOTE — PROGRESS NOTES
"  Physical Therapy Daily Treatment Note     Name: No Harris  Clinic Number: [id number]    Therapy Diagnosis:   Encounter Diagnosis   Name Primary?    Chronic midline low back pain without sciatica      Physician: Edy Hayes MD    Visit Date: 10/29/2018  Physician Orders: PT Eval and Treat low back pain  Medical Diagnosis from Referral: Chronic low back pain  Evaluation Date: 9/18/2018  Authorization Period Expiration: 12/31/18  Plan of Care Expiration: 12/1/18  Visit # / Visits authorized: 8 20     Time In: 1055  Time Out: 1155    Total Billable Time:55     Precautions: Standard and s/p tetralogy of Fallot repair (4 open heart surgeries last one in 2005; reports she still feels guarded/ the need to protect central chest incision; tolerates prone but be mindful)    Subjective     Pt reports: LB and B LE pain after working 3 days @ [employer] this weekend.   She was compliant with home exercise program.  Response to previous treatment: min soreness after last tx.   Functional change: improved mobility tolerating 3 days of work but exhausted and soreness - continue work with endurance.    Pain 3/10  Location: back and B LE     Objective     No received therapeutic exercises to develop strength, endurance, ROM, flexibility, posture and core stabilization for 40 minutes including:    Moist heat for 10' LB for increased circulation, and tissue healing.  TA 2x10/5"  TA Marching 2'  OTB Calm shell 20x ea  palloff press OTB 30x ea  Leg press 80# 2x10  OTB side stepping 1 lap  B SL hip abd 2x10  Wall squats 2x10    TA lat pull downs- NP    No received the following manual therapy techniques: Joint mobilizations and Soft tissue Mobilization were applied to the: low back for 15 minutes, including:  B LLLD 2x/1' ea, HSS, GSS, Piriformis stretch, glut stretch     Home Exercises Provided and Patient Education Provided     Education provided:   - Importance of HEP and use of ice to manage symptoms.    Written Home " ADS/Event Note Exercises Provided: yes.  Exercises were reviewed and No was able to demonstrate them prior to the end of the session.  No demonstrated good  understanding of the education provided.         Assessment     Pt tolerating tx well with no increased LB but mm fatigue. Continue to progress as tolerated      No is progressing well towards her goals.   Pt prognosis is Excellent.     Pt will continue to benefit from skilled outpatient physical therapy to address the deficits listed in the problem list box on initial evaluation, provide pt/family education and to maximize pt's level of independence in the home and community environment.     Pt's spiritual, cultural and educational needs considered and pt agreeable to plan of care and goals.    Anticipated barriers to physical therapy:     Goals: Short Term Goals: 3 weeks   1. Pt will be able demonstrate pain free MMT of glute (extension/ abduction) on L with at least 4/5 strength to demonstrate improved hip strength/ function (not met, progressing)  2. Pt will be able to demonstrate good transverse abdominus recruitment/ bracing during functional activities to demonstrate improved core stability (bracing with 5 anti rotation dynamic lunge/ squats motions reporting no low back pain bilat)(not met, progressing)  3. Pt will be able to participate in swing dance class making modifications when able reporting less than 4/10 LBP the following day(not met, progressing)     Long Term Goals: 10 weeks   1. Pt will be able to performing 10 ASLR bilat reporting 0/10 LBP to demonstrate improved core stability(not met, progressing)  2. Pt will demonstrate at least 10 degrees of passive and active hip extension bilat without demonstrating lumbar hyperextension (not met, progressing)  3. Pt will be able to tolerate one full week of swing dancing class reporting less than 3/10 LBP (not met, progressing)  4. Pt will report participating in other forms of physical activity aside from  swing dancing to avoid overuse injuries and maintain good muscular balance(not met, progressing)    Plan     Progress per POC- core and Le strengthening     Nadeem Wiley, PTA, STS

## 2018-11-26 DIAGNOSIS — Z30.9 ENCOUNTER FOR CONTRACEPTIVE MANAGEMENT, UNSPECIFIED TYPE: ICD-10-CM

## 2018-11-26 RX ORDER — NORETHINDRONE ACETATE AND ETHINYL ESTRADIOL 1.5-30(21)
KIT ORAL
Qty: 84 TABLET | Refills: 3 | Status: SHIPPED | OUTPATIENT
Start: 2018-11-26 | End: 2019-02-12 | Stop reason: SDUPTHER

## 2019-02-12 ENCOUNTER — OFFICE VISIT (OUTPATIENT)
Dept: OBSTETRICS AND GYNECOLOGY | Facility: CLINIC | Age: 29
End: 2019-02-12
Payer: COMMERCIAL

## 2019-02-12 VITALS
DIASTOLIC BLOOD PRESSURE: 62 MMHG | HEIGHT: 68 IN | SYSTOLIC BLOOD PRESSURE: 102 MMHG | BODY MASS INDEX: 21.05 KG/M2 | WEIGHT: 138.88 LBS

## 2019-02-12 DIAGNOSIS — Z01.419 ENCOUNTER FOR GYNECOLOGICAL EXAMINATION: Primary | ICD-10-CM

## 2019-02-12 DIAGNOSIS — Z12.4 ENCOUNTER FOR PAPANICOLAOU SMEAR FOR CERVICAL CANCER SCREENING: ICD-10-CM

## 2019-02-12 DIAGNOSIS — Z30.9 ENCOUNTER FOR CONTRACEPTIVE MANAGEMENT, UNSPECIFIED TYPE: ICD-10-CM

## 2019-02-12 PROCEDURE — 99395 PREV VISIT EST AGE 18-39: CPT | Mod: S$GLB,,, | Performed by: OBSTETRICS & GYNECOLOGY

## 2019-02-12 PROCEDURE — 99395 PR PREVENTIVE VISIT,EST,18-39: ICD-10-PCS | Mod: S$GLB,,, | Performed by: OBSTETRICS & GYNECOLOGY

## 2019-02-12 PROCEDURE — 99999 PR PBB SHADOW E&M-EST. PATIENT-LVL III: CPT | Mod: PBBFAC,,, | Performed by: OBSTETRICS & GYNECOLOGY

## 2019-02-12 PROCEDURE — 99999 PR PBB SHADOW E&M-EST. PATIENT-LVL III: ICD-10-PCS | Mod: PBBFAC,,, | Performed by: OBSTETRICS & GYNECOLOGY

## 2019-02-12 PROCEDURE — 88175 CYTOPATH C/V AUTO FLUID REDO: CPT

## 2019-02-12 RX ORDER — NORETHINDRONE ACETATE AND ETHINYL ESTRADIOL 1.5-30(21)
1 KIT ORAL DAILY
Qty: 84 TABLET | Refills: 3 | Status: SHIPPED | OUTPATIENT
Start: 2019-02-12 | End: 2019-06-17 | Stop reason: SDUPTHER

## 2019-02-12 NOTE — PROGRESS NOTES
CC: Well woman exam    No Harris is a 28 y.o. female  presents for a well woman exam.  Likes her ocps, does not want kids, never discussed with her cardiologist but knows needs preconceptual counseling. Moving to [place] to be by family    Half sister hospitalized for rare autoimmune disease with kidney failure and complications.  She is 41.  Pt is looking into getting tested.     Past Medical History:   Diagnosis Date    Abnormal Pap smear of cervix 2012    LSIL pap; 10/2012 Colpo DAVID 1    Anemia     Childhood asthma     Environmental allergies     Fever blister     recurrent    History of HPV infection     Oral contraceptive use     Tetralogy of Fallot s/p repair     w/ valve replacement       Past Surgical History:   Procedure Laterality Date    BREAST RECONSTRUCTION      x3 on the right side    BREAST SURGERY Right 2016    Removal of previous implant and liposuction; fat graft to breast. Done in [place].  3 augmentations on right side    CARDIAC VALVE REPLACEMENT      COLPOSCOPY      DAVID 1    EYE SURGERY      Open heart surgery      x4 to correct septal defects and 2 vavle replacement; right breast implant    RETINAL LASER PROCEDURE         OB History    Para Term  AB Living   0 0 0 0 0 0   SAB TAB Ectopic Multiple Live Births   0 0 0 0           Obstetric Comments   Menarche ~        Family History   Problem Relation Age of Onset    No Known Problems Father     No Known Problems Mother     Lung cancer Maternal Grandmother         lung cancer - smoker    Heart disease Maternal Grandfather         CABG x 4    Heart disease Paternal Grandmother         open heart surgery and heart failure    Heart disease Paternal Grandfather         CABG x 4    Lung cancer Other     Autoimmune disease Sister 41        kidney failure    Breast cancer Neg Hx     Diabetes Neg Hx     Stroke Neg Hx     Hypertension Neg Hx     Hyperlipidemia Neg Hx        Social  "History     Tobacco Use    Smoking status: Never Smoker    Smokeless tobacco: Never Used   Substance Use Topics    Alcohol use: Yes     Comment: Social     Drug use: No       /62   Ht 5' 8" (1.727 m)   Wt 63 kg (138 lb 14.2 oz)   LMP 01/16/2019 (Exact Date)   BMI 21.12 kg/m²     ROS:  GENERAL: Denies weight gain or weight loss. Feeling well overall.   SKIN: Denies rash or lesions.   HEAD: Denies head injury or headache.   NODES: Denies enlarged lymph nodes.   CHEST: Denies chest pain or shortness of breath.   CARDIOVASCULAR: Denies palpitations or left sided chest pain.   ABDOMEN: No abdominal pain, constipation, diarrhea, nausea, vomiting or rectal bleeding.   URINARY: No frequency, dysuria, hematuria, or burning on urination.  REPRODUCTIVE: See HPI.   BREASTS: The patient performs breast self-examination and denies pain, lumps, or nipple discharge.   HEMATOLOGIC: No easy bruisability or excessive bleeding.  MUSCULOSKELETAL: Denies joint pain or swelling.   NEUROLOGIC: Denies syncope or weakness.   PSYCHIATRIC: Denies depression, anxiety or mood swings.    Physical Exam:    APPEARANCE: Well nourished, well developed, in no acute distress.  AFFECT: WNL, alert and oriented x 3  SKIN: No acne or hirsutism  NECK: Neck symmetric without masses or thyromegaly  NODES: No inguinal, cervical, axillary, or femoral lymph node enlargement  CHEST: Good respiratory effect  ABDOMEN: Soft.  No tenderness or masses.  No hepatosplenomegaly.  No hernias.  BREASTS: Symmetrical, no skin changes or visible lesions.  No palpable masses, nipple discharge bilaterally.  PELVIC: Normal external genitalia without lesions.  Normal hair distribution.  Adequate perineal body, normal urethral meatus.  Vagina moist and well rugated without lesions or discharge.  Cervix pink, without lesions, discharge or tenderness.  No significant cystocele or rectocele.  Bimanual exam shows uterus to be normal size, regular, mobile and nontender.  " Adnexa without masses or tenderness.    EXTREMITIES: No edema.    ASSESSMENT AND PLAN  1. Encounter for gynecological examination     2. Encounter for contraceptive management, unspecified type  norethindrone-ethinyl estradiol-iron (BLISOVI FE 1.5/30, 28,) 1.5 mg-30 mcg (21)/75 mg (7) tablet   3. Encounter for Papanicolaou smear for cervical cancer screening  Liquid-based pap smear, screening       Patient was counseled today on A.C.S. Pap guidelines and recommendations for yearly pelvic exams, mammograms and monthly self breast exams; to see her PCP for other health maintenance.     Follow-up in about 1 year (around 2/12/2020).

## 2019-04-12 ENCOUNTER — TELEPHONE (OUTPATIENT)
Dept: OBSTETRICS AND GYNECOLOGY | Facility: CLINIC | Age: 29
End: 2019-04-12

## 2019-04-12 RX ORDER — FLUCONAZOLE 150 MG/1
150 TABLET ORAL ONCE
Qty: 2 TABLET | Refills: 0 | Status: SHIPPED | OUTPATIENT
Start: 2019-04-12 | End: 2019-04-12

## 2019-04-12 NOTE — TELEPHONE ENCOUNTER
Pt called to check status of message, I informed pt that medication has been sent to pharm. Pt verbalized understanding.

## 2019-06-17 DIAGNOSIS — Z30.9 ENCOUNTER FOR CONTRACEPTIVE MANAGEMENT, UNSPECIFIED TYPE: ICD-10-CM

## 2019-06-18 RX ORDER — NORETHINDRONE ACETATE AND ETHINYL ESTRADIOL 1.5-30(21)
1 KIT ORAL DAILY
Qty: 84 TABLET | Refills: 0 | Status: SHIPPED | OUTPATIENT
Start: 2019-06-18

## 2020-02-18 DIAGNOSIS — Z30.9 ENCOUNTER FOR CONTRACEPTIVE MANAGEMENT, UNSPECIFIED TYPE: ICD-10-CM

## 2020-02-18 RX ORDER — NORETHINDRONE ACETATE AND ETHINYL ESTRADIOL 1.5-30(21)
1 KIT ORAL DAILY
Qty: 84 TABLET | Refills: 3 | OUTPATIENT
Start: 2020-02-18

## 2020-10-08 ENCOUNTER — PATIENT MESSAGE (OUTPATIENT)
Dept: PRIMARY CARE CLINIC | Facility: CLINIC | Age: 30
End: 2020-10-08

## 2021-01-04 ENCOUNTER — PATIENT MESSAGE (OUTPATIENT)
Dept: ADMINISTRATIVE | Facility: HOSPITAL | Age: 31
End: 2021-01-04

## 2021-04-05 ENCOUNTER — PATIENT MESSAGE (OUTPATIENT)
Dept: ADMINISTRATIVE | Facility: HOSPITAL | Age: 31
End: 2021-04-05

## 2021-04-16 ENCOUNTER — PATIENT MESSAGE (OUTPATIENT)
Dept: RESEARCH | Facility: HOSPITAL | Age: 31
End: 2021-04-16

## 2021-07-06 ENCOUNTER — PATIENT MESSAGE (OUTPATIENT)
Dept: ADMINISTRATIVE | Facility: HOSPITAL | Age: 31
End: 2021-07-06